# Patient Record
Sex: FEMALE | Race: BLACK OR AFRICAN AMERICAN | NOT HISPANIC OR LATINO | URBAN - METROPOLITAN AREA
[De-identification: names, ages, dates, MRNs, and addresses within clinical notes are randomized per-mention and may not be internally consistent; named-entity substitution may affect disease eponyms.]

---

## 2023-04-05 DIAGNOSIS — I10 PRIMARY HYPERTENSION: Primary | ICD-10-CM

## 2023-04-05 RX ORDER — LOSARTAN POTASSIUM 50 MG/1
50 TABLET ORAL DAILY
Qty: 90 TABLET | Refills: 0 | Status: SHIPPED | OUTPATIENT
Start: 2023-04-05 | End: 2023-06-27 | Stop reason: SDUPTHER

## 2023-04-05 RX ORDER — LOSARTAN POTASSIUM 50 MG/1
50 TABLET ORAL DAILY
COMMUNITY
End: 2023-04-05 | Stop reason: SDUPTHER

## 2023-06-27 ENCOUNTER — TELEPHONE (OUTPATIENT)
Dept: PRIMARY CARE | Facility: CLINIC | Age: 88
End: 2023-06-27
Payer: COMMERCIAL

## 2023-06-27 DIAGNOSIS — I10 PRIMARY HYPERTENSION: ICD-10-CM

## 2023-06-27 RX ORDER — LOSARTAN POTASSIUM 50 MG/1
50 TABLET ORAL DAILY
Qty: 90 TABLET | Refills: 0 | Status: SHIPPED | OUTPATIENT
Start: 2023-06-27 | End: 2023-07-17 | Stop reason: SDUPTHER

## 2023-06-27 NOTE — TELEPHONE ENCOUNTER
RF for RX: Losartan 50 mg      [facility]                            CAN YOU PEND AND SEND TO SEE IF MAGGIE WILL FILL

## 2023-07-17 ENCOUNTER — OFFICE VISIT (OUTPATIENT)
Dept: PRIMARY CARE | Facility: CLINIC | Age: 88
End: 2023-07-17
Payer: COMMERCIAL

## 2023-07-17 VITALS
DIASTOLIC BLOOD PRESSURE: 60 MMHG | HEIGHT: 60 IN | SYSTOLIC BLOOD PRESSURE: 130 MMHG | OXYGEN SATURATION: 95 % | BODY MASS INDEX: 36.6 KG/M2 | HEART RATE: 70 BPM | WEIGHT: 186.4 LBS

## 2023-07-17 DIAGNOSIS — I71.21 AORTIC ROOT ANEURYSM (CMS-HCC): ICD-10-CM

## 2023-07-17 DIAGNOSIS — I10 PRIMARY HYPERTENSION: ICD-10-CM

## 2023-07-17 DIAGNOSIS — E87.6 HYPOKALEMIA: ICD-10-CM

## 2023-07-17 DIAGNOSIS — I73.9 PERIPHERAL VASCULAR DISEASE, UNSPECIFIED (CMS-HCC): Primary | ICD-10-CM

## 2023-07-17 DIAGNOSIS — I10 ESSENTIAL (PRIMARY) HYPERTENSION: ICD-10-CM

## 2023-07-17 DIAGNOSIS — E78.2 MIXED HYPERLIPIDEMIA: ICD-10-CM

## 2023-07-17 DIAGNOSIS — E78.5 HYPERLIPIDEMIA, UNSPECIFIED: ICD-10-CM

## 2023-07-17 PROCEDURE — 99214 OFFICE O/P EST MOD 30 MIN: CPT | Performed by: STUDENT IN AN ORGANIZED HEALTH CARE EDUCATION/TRAINING PROGRAM

## 2023-07-17 PROCEDURE — 3075F SYST BP GE 130 - 139MM HG: CPT | Performed by: STUDENT IN AN ORGANIZED HEALTH CARE EDUCATION/TRAINING PROGRAM

## 2023-07-17 PROCEDURE — 3078F DIAST BP <80 MM HG: CPT | Performed by: STUDENT IN AN ORGANIZED HEALTH CARE EDUCATION/TRAINING PROGRAM

## 2023-07-17 PROCEDURE — 1036F TOBACCO NON-USER: CPT | Performed by: STUDENT IN AN ORGANIZED HEALTH CARE EDUCATION/TRAINING PROGRAM

## 2023-07-17 PROCEDURE — 1160F RVW MEDS BY RX/DR IN RCRD: CPT | Performed by: STUDENT IN AN ORGANIZED HEALTH CARE EDUCATION/TRAINING PROGRAM

## 2023-07-17 PROCEDURE — 1159F MED LIST DOCD IN RCRD: CPT | Performed by: STUDENT IN AN ORGANIZED HEALTH CARE EDUCATION/TRAINING PROGRAM

## 2023-07-17 RX ORDER — FLUTICASONE PROPIONATE 220 UG/1
2 AEROSOL, METERED RESPIRATORY (INHALATION) 2 TIMES DAILY
COMMUNITY
End: 2024-01-08 | Stop reason: SDUPTHER

## 2023-07-17 RX ORDER — FLUTICASONE PROPIONATE 50 MCG
SPRAY, SUSPENSION (ML) NASAL
COMMUNITY
End: 2024-01-15 | Stop reason: SDUPTHER

## 2023-07-17 RX ORDER — LANOLIN ALCOHOL/MO/W.PET/CERES
CREAM (GRAM) TOPICAL
COMMUNITY

## 2023-07-17 RX ORDER — POTASSIUM CHLORIDE 20 MEQ/1
20 TABLET, EXTENDED RELEASE ORAL 2 TIMES DAILY
Qty: 180 TABLET | Refills: 3 | Status: SHIPPED | OUTPATIENT
Start: 2023-07-17

## 2023-07-17 RX ORDER — ATENOLOL 25 MG/1
25 TABLET ORAL DAILY
Qty: 90 TABLET | Refills: 3 | Status: SHIPPED | OUTPATIENT
Start: 2023-07-17 | End: 2023-12-11 | Stop reason: WASHOUT

## 2023-07-17 RX ORDER — HYDROCHLOROTHIAZIDE 25 MG/1
25 TABLET ORAL DAILY
Qty: 90 TABLET | Refills: 1 | Status: SHIPPED | OUTPATIENT
Start: 2023-07-17 | End: 2024-01-09 | Stop reason: SDUPTHER

## 2023-07-17 RX ORDER — MONTELUKAST SODIUM 10 MG/1
10 TABLET ORAL EVERY EVENING
COMMUNITY
End: 2023-10-03

## 2023-07-17 RX ORDER — ATORVASTATIN CALCIUM 40 MG/1
40 TABLET, FILM COATED ORAL NIGHTLY
Qty: 90 TABLET | Refills: 3 | Status: SHIPPED | OUTPATIENT
Start: 2023-07-17

## 2023-07-17 RX ORDER — LOSARTAN POTASSIUM 50 MG/1
50 TABLET ORAL DAILY
Qty: 90 TABLET | Refills: 3 | Status: SHIPPED | OUTPATIENT
Start: 2023-07-17

## 2023-07-17 RX ORDER — CYANOCOBALAMIN (VITAMIN B-12) 500 MCG
TABLET ORAL
COMMUNITY
Start: 2019-06-27

## 2023-07-17 RX ORDER — ALBUTEROL SULFATE 0.83 MG/ML
SOLUTION RESPIRATORY (INHALATION)
COMMUNITY
End: 2023-11-07

## 2023-07-17 NOTE — PATIENT INSTRUCTIONS
Please get your labs done , fasting , Do not eat anything for 12 hrs after dinner and come back the next morning to the lab for blood draw.   Also schedule the ultrasound of your heart  ( Call Echocardiogram )  to check on the aneurysm  ( weakness  of the aorta , big blood vessel  that comes out of the heart )       We are on a new system that is paperless.     Lab location for blood work :  1. Located across the office , just past the elevators .   2. [address].  If you are coming on a Saturday, go to [address] for the blood draw    Radiology : [address] for Xrays  You can also schedule non urgent imaging by calling .     For scheduling appts, call . You might be receiving call from central scheduling as well.    For scheduling colonoscopy, call .     For scheduling physical therapy, call [phone] ( [phone]).    For any other scheduling questions or locations, please ask the medical assistant or the .

## 2023-07-17 NOTE — PROGRESS NOTES
Subjective   Patient ID: Eileen Myers is a 89 y.o. female who presents for New Patient Visit (Establish care. ).        HPI    Est care   Pt's PMH, PSH, SH, FH , meds and allergies was obtained / reviewed and updated .     89 y/oHTN, HLD, Asthma, GERD,  thorasic aortic ectasia, Pulmonary Nodule, Thyroid Nodules, Cervical Radiculopathy, & Osteoarthritis.      Lives at home by herself , has  help at home, one floor    Son brought her here today   She also sees pulm for asthma care       Visit Vitals  /60   Pulse 70   Ht 1.524 m (5')   Wt 84.6 kg (186 lb 6.4 oz)   SpO2 95%   BMI 36.40 kg/m²   Smoking Status Never   BSA 1.89 m²      No LMP recorded.     Review of Systems    Constitutional : No feeling poorly / fevers/ chills / night sweats/ fatigue   Cardiovascular : No CP /Palpitations/ lower extremity edema / syncope   Respiratory : No Cough /DESIR/Dyspnea at rest   Gastrointestinal : No abd pain / N/V  No bloody stools/ melena / constipation  Endo : No polyuria/polydipsia/ muscle weakness / sluggishness   CNS: No confusion / HA/ tingling/ numbness/ weakness of extremities  Psychiatric: No anxiety/ depression/ SI/HI    All other systems have been reviewed and are negative for complaint       Physical Exam    Constitutional : Vitals reviewed. Alert and in no distress  Cardiovascular : RRR, Normal S1, S2, No pericardial rub/ gallop, no peripheral edema   Pulmonary: No respiratory distress, CTAB   MSK :  uses a rollator  Neurologic : CNs 2-12 grossly intact , no obvious FNDs  Psych : A,Ox3, normal mood and affect      Assessment/Plan   Diagnoses and all orders for this visit:  Peripheral vascular disease, unspecified (CMS/HCC)  Primary hypertension  -     CBC; Future  -     Comprehensive Metabolic Panel; Future  -     losartan (Cozaar) 50 mg tablet; Take 1 tablet (50 mg) by mouth once daily.  Mixed hyperlipidemia  -     Lipid Panel; Future  -     TSH with reflex to Free T4 if abnormal; Future  Aortic root aneurysm  (CMS/HCC)  -     Transthoracic Echo (TTE) Complete; Future  Essential (primary) hypertension  -     atenolol (Tenormin) 25 mg tablet; Take 1 tablet (25 mg) by mouth once daily.  -     hydroCHLOROthiazide (HYDRODiuril) 25 mg tablet; Take 1 tablet (25 mg) by mouth once daily.  Hyperlipidemia, unspecified  -     atorvastatin (Lipitor) 40 mg tablet; Take 1 tablet (40 mg) by mouth once daily at bedtime.  Hypokalemia  -     potassium chloride CR (Klor-Con M20) 20 mEq ER tablet; Take 1 tablet (20 mEq) by mouth 2 times a day. Do not crush or chew.    89 y/oHTN, HLD, Asthma, GERD,  thorasic aortic ectasia, Pulmonary Nodule, Thyroid Nodules, Cervical Radiculopathy, & Osteoarthritis.      Echo to monitor thoracic aortic ectasia   Conditions addressed and mgmt as noted above.  Pertinent labs, images/ imaging reports , chart review was done .   Age appropriate labs / labs for mgmt of chronic medical conditions ordered, further mgmt pending the results.          RTO in  Dec for DIANA

## 2023-07-27 ENCOUNTER — PATIENT OUTREACH (OUTPATIENT)
Dept: PRIMARY CARE | Facility: CLINIC | Age: 88
End: 2023-07-27
Payer: COMMERCIAL

## 2023-07-27 RX ORDER — CARVEDILOL 6.25 MG/1
6.25 TABLET ORAL
COMMUNITY
End: 2023-08-07 | Stop reason: SDUPTHER

## 2023-07-27 NOTE — PROGRESS NOTES
Discharge Facility:  [facility]  Discharge Diagnosis:   Hypokalemia, hypomagnesemia, elevated troponin, hypertension  dizziness  Admission Date:  7/25/23  Discharge Date: 7/26/23    PCP Appointment Date:  8/7/23  Specialist Appointment Date:  kala  Hospital Encounter and Summary: Linked   See discharge assessment below for further details     Engagement  Call Start Time: 1222 (7/27/2023 12:24 PM)    Medications  Medications reviewed with patient/caregiver?: Yes (7/27/2023 12:24 PM)  Is the patient having any side effects they believe may be caused by any medication additions or changes?: No (7/27/2023 12:24 PM)  Does the patient have all medications ordered at discharge?: Yes (7/27/2023 12:24 PM)  Is the patient taking all medications as directed (includes completed medication regime)?: Yes (7/27/2023 12:24 PM)  Medication Comments: reviewed new, changed, and discontinued meds.  carvidilol is new (7/27/2023 12:24 PM)    Appointments  Does the patient have a primary care provider?: Yes (7/27/2023 12:24 PM)  Care Management Interventions: -- (appt made 8/7/23) (7/27/2023 12:24 PM)    Self Management  Has home health visited the patient within 72 hours of discharge?: Not applicable (7/27/2023 12:24 PM)    Patient Teaching  Does the patient have access to their discharge instructions?: Yes (7/27/2023 12:24 PM)  What is the patient's perception of their health status since discharge?: Improving (7/27/2023 12:24 PM)  Is the patient/caregiver able to teach back the hierarchy of who to call/visit for symptoms/problems? PCP, Specialist, Home Health nurse, Urgent Care, ED, 911: Yes (7/27/2023 12:24 PM)  Patient/Caregiver Education Comments: states doing better.  has all medications.  no questions or concerns. (7/27/2023 12:24 PM)

## 2023-08-07 ENCOUNTER — OFFICE VISIT (OUTPATIENT)
Dept: PRIMARY CARE | Facility: CLINIC | Age: 88
End: 2023-08-07
Payer: COMMERCIAL

## 2023-08-07 VITALS
HEIGHT: 60 IN | DIASTOLIC BLOOD PRESSURE: 57 MMHG | WEIGHT: 179.6 LBS | OXYGEN SATURATION: 96 % | HEART RATE: 66 BPM | BODY MASS INDEX: 35.26 KG/M2 | SYSTOLIC BLOOD PRESSURE: 120 MMHG

## 2023-08-07 DIAGNOSIS — Z09 HOSPITAL DISCHARGE FOLLOW-UP: ICD-10-CM

## 2023-08-07 DIAGNOSIS — I10 PRIMARY HYPERTENSION: ICD-10-CM

## 2023-08-07 DIAGNOSIS — E66.01 OBESITY, MORBID (MULTI): Primary | ICD-10-CM

## 2023-08-07 PROCEDURE — 99495 TRANSJ CARE MGMT MOD F2F 14D: CPT | Performed by: STUDENT IN AN ORGANIZED HEALTH CARE EDUCATION/TRAINING PROGRAM

## 2023-08-07 PROCEDURE — 1159F MED LIST DOCD IN RCRD: CPT | Performed by: STUDENT IN AN ORGANIZED HEALTH CARE EDUCATION/TRAINING PROGRAM

## 2023-08-07 PROCEDURE — 3074F SYST BP LT 130 MM HG: CPT | Performed by: STUDENT IN AN ORGANIZED HEALTH CARE EDUCATION/TRAINING PROGRAM

## 2023-08-07 PROCEDURE — 1036F TOBACCO NON-USER: CPT | Performed by: STUDENT IN AN ORGANIZED HEALTH CARE EDUCATION/TRAINING PROGRAM

## 2023-08-07 PROCEDURE — 1160F RVW MEDS BY RX/DR IN RCRD: CPT | Performed by: STUDENT IN AN ORGANIZED HEALTH CARE EDUCATION/TRAINING PROGRAM

## 2023-08-07 PROCEDURE — 3078F DIAST BP <80 MM HG: CPT | Performed by: STUDENT IN AN ORGANIZED HEALTH CARE EDUCATION/TRAINING PROGRAM

## 2023-08-07 RX ORDER — CARVEDILOL 6.25 MG/1
6.25 TABLET ORAL
Qty: 180 TABLET | Refills: 3 | Status: SHIPPED | OUTPATIENT
Start: 2023-08-07

## 2023-08-07 NOTE — PROGRESS NOTES
Subjective   Patient ID: Eileen Myers is a 89 y.o. female who presents for hosp dc fu         HPI    89 y/o female with HTN, HPL , Thyroid and  Pulm nodules, mild fusiform dilatation of the ascending thoracic aorta, Cervical Radiculopathy, & Osteoarthritis.        Hosp dc fu   7/25- 7/26  She had non bloody non bilious emesis prior to the hospitalization , found to have electrolyte disturbances and elevated trop   ACS was r/o       Visit Vitals  /57   Pulse 66   Ht 1.524 m (5')   Wt 81.5 kg (179 lb 9.6 oz)   SpO2 96%   BMI 35.08 kg/m²   Smoking Status Never   BSA 1.86 m²      No LMP recorded.     Review of Systems    Constitutional : No feeling poorly / fevers/ chills / night sweats/ fatigue   Cardiovascular : No CP /Palpitations/ lower extremity edema / syncope   Respiratory : No Cough /DESIR/Dyspnea at rest   Gastrointestinal : No abd pain / N/V  No bloody stools/ melena / constipation  Endo : No polyuria/polydipsia/ muscle weakness / sluggishness   CNS: No confusion / HA/ tingling/ numbness/ weakness of extremities  Psychiatric: No anxiety/ depression/ SI/HI    All other systems have been reviewed and are negative for complaint       Physical Exam    Constitutional : Vitals reviewed. Alert and in no distress  Cardiovascular : RRR, Normal S1, S2, No pericardial rub/ gallop, no peripheral edema   Pulmonary: No respiratory distress, CTAB   MSK : Normal gait and station , strength and tone     Neurologic : CNs 2-12 grossly intact , no obvious FNDs  Psych : A,Ox3, normal mood and affect      Assessment/Plan   Diagnoses and all orders for this visit:  Obesity, morbid (CMS/HCC)  Hospital discharge follow-up       89 y/o female with HTN, HPL , Thyroid and  Pulm nodules, mild fusiform dilatation of the ascending thoracic aorta, Cervical Radiculopathy, & Osteoarthritis.      Hospital course, labs and imaging reports reviewed . Med reconciliation done . F/u labs/Imaging, if needed were ordered .   Electrolyte loss  likely from emesis .   Pt advised to stop Atenolol , she is now on Carvedilol 6.25mg BID   RTO as previously advised ( Dec ) or sooner If needed.

## 2023-08-07 NOTE — PATIENT INSTRUCTIONS
Stop the atenolol. Continue Carvedilol 6.25 BID       We are on a new system that is paperless.     Lab location for blood work  ( [address] office ):  1. Located across the office , just past the elevators .   2. [address].  If you are coming on a Saturday, go to [address] for the blood draw    Radiology : [address] for Xrays  You can also schedule non urgent imaging by calling .     For scheduling appts, call . You might be receiving call from central scheduling as well.    For scheduling colonoscopy, call .     For scheduling physical therapy, call [phone] ( [phone]).    For any other scheduling questions or locations, please ask the medical assistant or the .

## 2023-08-10 ENCOUNTER — PATIENT OUTREACH (OUTPATIENT)
Dept: PRIMARY CARE | Facility: CLINIC | Age: 88
End: 2023-08-10
Payer: COMMERCIAL

## 2023-08-10 NOTE — PROGRESS NOTES
Call regarding appt. with PCP on 8/7/23 after hospitalization.  At time of outreach call the patient feels as if their condition has improved since last visit.  Reviewed the PCP appointment with the pt and addressed any questions or concerns.   No questions or concerns.  States appt went good.  Aware to call with questions or concerns.

## 2023-08-14 ENCOUNTER — TELEPHONE (OUTPATIENT)
Dept: PRIMARY CARE | Facility: CLINIC | Age: 88
End: 2023-08-14

## 2023-08-14 NOTE — TELEPHONE ENCOUNTER
PT had called and needs a prescription for home health care and a walker that helps hold her ventilator.  PT also wants a portable toilet so that she can have it on her bed side.  PT wants home health care to be there for more than 1 hour a day and more than 1 day a week.  PT wants to have home health care at her house on Mondays, Wednesdays and Fridays.  PT wants to be let know when and how long the home health care would be at the PT house.

## 2023-08-24 ENCOUNTER — PATIENT OUTREACH (OUTPATIENT)
Dept: PRIMARY CARE | Facility: CLINIC | Age: 88
End: 2023-08-24
Payer: COMMERCIAL

## 2023-08-24 NOTE — PROGRESS NOTES
Call placed regarding one month post discharge follow up call.  At time of outreach call the patient feels as if their condition has improved since initial visit with PCP or specialist.  States doing good.  No questions or concerns at this time.

## 2023-09-25 NOTE — TELEPHONE ENCOUNTER
PT calling back to see if Dr. Gonzales can write a script for a walker with a seat. PT also would like homecare services because she needs help and would like to stay in her home. Please advise

## 2023-10-02 ENCOUNTER — TELEPHONE (OUTPATIENT)
Dept: PRIMARY CARE | Facility: CLINIC | Age: 88
End: 2023-10-02
Payer: COMMERCIAL

## 2023-10-02 NOTE — TELEPHONE ENCOUNTER
PT called in and stated that she needs RF on all her scripts 90 day supply sent to [facility]

## 2023-10-09 DIAGNOSIS — R26.2 DETERIORATION IN ABILITY TO WALK: Primary | ICD-10-CM

## 2023-10-09 DIAGNOSIS — Z91.81 AT MAXIMUM RISK FOR FALL: ICD-10-CM

## 2023-10-23 ENCOUNTER — PATIENT OUTREACH (OUTPATIENT)
Dept: PRIMARY CARE | Facility: CLINIC | Age: 88
End: 2023-10-23
Payer: COMMERCIAL

## 2023-10-23 NOTE — PROGRESS NOTES
Patient has met target of no readmission for (90) days post hospital  discharge and is graduated from Transitional Care Management program at this time.     Spoke with melvin.  States doing good.  Aware to follow up with PCP as needed/directed.

## 2023-11-07 DIAGNOSIS — J45.909 UNSPECIFIED ASTHMA, UNCOMPLICATED (HHS-HCC): ICD-10-CM

## 2023-11-07 RX ORDER — ALBUTEROL SULFATE 0.83 MG/ML
SOLUTION RESPIRATORY (INHALATION)
Qty: 360 ML | Refills: 2 | Status: SHIPPED | OUTPATIENT
Start: 2023-11-07 | End: 2024-01-08 | Stop reason: SDUPTHER

## 2023-11-14 NOTE — TELEPHONE ENCOUNTER
Patient sts her nebulizer has stopped working. She reached out to [facility] and they advised that we send in a new order since her machine is over 5 years old.   
verbal instruction

## 2023-11-27 ENCOUNTER — TELEPHONE (OUTPATIENT)
Dept: PULMONOLOGY | Facility: CLINIC | Age: 88
End: 2023-11-27
Payer: COMMERCIAL

## 2023-11-27 NOTE — TELEPHONE ENCOUNTER
Patient is requesting a new nebulizer. She sts the one she has is over 5yrs old and has stopped working.    Please send a new script to [facility].

## 2023-11-28 ENCOUNTER — TELEPHONE (OUTPATIENT)
Dept: PULMONOLOGY | Facility: CLINIC | Age: 88
End: 2023-11-28
Payer: COMMERCIAL

## 2023-11-28 NOTE — TELEPHONE ENCOUNTER
Script was sent to [facility] as requested for anew nebulizer machine

## 2023-11-28 NOTE — TELEPHONE ENCOUNTER
Favio from [facility] in order for the patient to get a new nebulizer machine she has to be seen in our office in the last 30 days per insurance requirements.    I spoke directly with the patient informing her of the information. She already has an appointment set up for 12/11/23 for her usual follow up and is willing to wait until then. She sts she can still use the nebulizer machine it just takes a while.

## 2023-12-11 ENCOUNTER — OFFICE VISIT (OUTPATIENT)
Dept: PULMONOLOGY | Facility: CLINIC | Age: 88
End: 2023-12-11
Payer: COMMERCIAL

## 2023-12-11 ENCOUNTER — OFFICE VISIT (OUTPATIENT)
Dept: PRIMARY CARE | Facility: CLINIC | Age: 88
End: 2023-12-11
Payer: COMMERCIAL

## 2023-12-11 VITALS
HEIGHT: 60 IN | RESPIRATION RATE: 17 BRPM | HEART RATE: 73 BPM | WEIGHT: 177 LBS | BODY MASS INDEX: 34.75 KG/M2 | DIASTOLIC BLOOD PRESSURE: 67 MMHG | OXYGEN SATURATION: 97 % | SYSTOLIC BLOOD PRESSURE: 137 MMHG

## 2023-12-11 VITALS
TEMPERATURE: 95.8 F | DIASTOLIC BLOOD PRESSURE: 62 MMHG | OXYGEN SATURATION: 99 % | SYSTOLIC BLOOD PRESSURE: 126 MMHG | HEIGHT: 60 IN | WEIGHT: 177.4 LBS | BODY MASS INDEX: 34.83 KG/M2 | HEART RATE: 78 BPM

## 2023-12-11 DIAGNOSIS — Z91.81 AT HIGH RISK FOR FALLS: ICD-10-CM

## 2023-12-11 DIAGNOSIS — Z00.00 MEDICARE ANNUAL WELLNESS VISIT, SUBSEQUENT: Primary | ICD-10-CM

## 2023-12-11 DIAGNOSIS — J45.909 ASTHMA, UNSPECIFIED ASTHMA SEVERITY, UNSPECIFIED WHETHER COMPLICATED, UNSPECIFIED WHETHER PERSISTENT (HHS-HCC): Primary | ICD-10-CM

## 2023-12-11 DIAGNOSIS — M15.9 GOA (GENERALIZED OSTEOARTHRITIS): ICD-10-CM

## 2023-12-11 PROBLEM — H04.129 DRY EYE: Status: ACTIVE | Noted: 2023-12-11

## 2023-12-11 PROBLEM — M54.12 CHRONIC CERVICAL RADICULOPATHY: Status: ACTIVE | Noted: 2023-12-11

## 2023-12-11 PROBLEM — M79.642 PAIN IN BOTH HANDS: Status: ACTIVE | Noted: 2023-12-11

## 2023-12-11 PROBLEM — M19.90 ARTHRITIS: Status: ACTIVE | Noted: 2023-12-11

## 2023-12-11 PROBLEM — E53.8 VITAMIN B 12 DEFICIENCY: Status: ACTIVE | Noted: 2023-12-11

## 2023-12-11 PROBLEM — R05.3 CHRONIC COUGH: Status: ACTIVE | Noted: 2023-12-11

## 2023-12-11 PROBLEM — R91.1 LUNG NODULE: Status: ACTIVE | Noted: 2023-12-11

## 2023-12-11 PROBLEM — E55.9 VITAMIN D DEFICIENCY: Status: ACTIVE | Noted: 2023-12-11

## 2023-12-11 PROBLEM — M17.0 OSTEOARTHRITIS OF BOTH KNEES: Status: ACTIVE | Noted: 2023-12-11

## 2023-12-11 PROBLEM — R53.81 PHYSICAL DECONDITIONING: Status: ACTIVE | Noted: 2023-12-11

## 2023-12-11 PROBLEM — I71.21 ASCENDING AORTIC ANEURYSM (CMS-HCC): Status: ACTIVE | Noted: 2023-12-11

## 2023-12-11 PROBLEM — M54.2 CERVICALGIA: Status: ACTIVE | Noted: 2023-12-11

## 2023-12-11 PROBLEM — M79.604 PAIN IN LATERAL RIGHT LOWER EXTREMITY: Status: ACTIVE | Noted: 2023-12-11

## 2023-12-11 PROBLEM — M79.641 PAIN IN BOTH HANDS: Status: ACTIVE | Noted: 2023-12-11

## 2023-12-11 PROBLEM — K21.9 GASTROESOPHAGEAL REFLUX DISEASE: Status: ACTIVE | Noted: 2023-12-11

## 2023-12-11 PROBLEM — E87.6 HYPOKALEMIA: Status: ACTIVE | Noted: 2023-12-11

## 2023-12-11 PROCEDURE — 99214 OFFICE O/P EST MOD 30 MIN: CPT | Performed by: INTERNAL MEDICINE

## 2023-12-11 PROCEDURE — 3078F DIAST BP <80 MM HG: CPT | Performed by: STUDENT IN AN ORGANIZED HEALTH CARE EDUCATION/TRAINING PROGRAM

## 2023-12-11 PROCEDURE — 3075F SYST BP GE 130 - 139MM HG: CPT | Performed by: STUDENT IN AN ORGANIZED HEALTH CARE EDUCATION/TRAINING PROGRAM

## 2023-12-11 PROCEDURE — 1036F TOBACCO NON-USER: CPT | Performed by: STUDENT IN AN ORGANIZED HEALTH CARE EDUCATION/TRAINING PROGRAM

## 2023-12-11 PROCEDURE — 1160F RVW MEDS BY RX/DR IN RCRD: CPT | Performed by: INTERNAL MEDICINE

## 2023-12-11 PROCEDURE — 1159F MED LIST DOCD IN RCRD: CPT | Performed by: INTERNAL MEDICINE

## 2023-12-11 PROCEDURE — 1170F FXNL STATUS ASSESSED: CPT | Performed by: STUDENT IN AN ORGANIZED HEALTH CARE EDUCATION/TRAINING PROGRAM

## 2023-12-11 PROCEDURE — 1036F TOBACCO NON-USER: CPT | Performed by: INTERNAL MEDICINE

## 2023-12-11 PROCEDURE — 1160F RVW MEDS BY RX/DR IN RCRD: CPT | Performed by: STUDENT IN AN ORGANIZED HEALTH CARE EDUCATION/TRAINING PROGRAM

## 2023-12-11 PROCEDURE — G0439 PPPS, SUBSEQ VISIT: HCPCS | Performed by: STUDENT IN AN ORGANIZED HEALTH CARE EDUCATION/TRAINING PROGRAM

## 2023-12-11 PROCEDURE — 1159F MED LIST DOCD IN RCRD: CPT | Performed by: STUDENT IN AN ORGANIZED HEALTH CARE EDUCATION/TRAINING PROGRAM

## 2023-12-11 ASSESSMENT — ENCOUNTER SYMPTOMS
WHEEZING: 0
EYE DISCHARGE: 0
UNEXPECTED WEIGHT CHANGE: 0
STRIDOR: 0
COUGH: 0
SINUS PAIN: 0
ABDOMINAL PAIN: 0
ARTHRALGIAS: 0
FREQUENCY: 0
TREMORS: 0
SINUS PRESSURE: 0
DYSURIA: 0
DIFFICULTY URINATING: 0
ABDOMINAL DISTENTION: 0
DIZZINESS: 0
OCCASIONAL FEELINGS OF UNSTEADINESS: 0
FEVER: 0
NUMBNESS: 0
EYE REDNESS: 0
LOSS OF SENSATION IN FEET: 1
CHOKING: 0
APNEA: 0
CONSTIPATION: 0
AGITATION: 0
BRUISES/BLEEDS EASILY: 0
NAUSEA: 0
PALPITATIONS: 0
SHORTNESS OF BREATH: 0
JOINT SWELLING: 0
NERVOUS/ANXIOUS: 0
RHINORRHEA: 0
FACIAL SWELLING: 0
DEPRESSION: 0
LIGHT-HEADEDNESS: 0
HEADACHES: 0
ADENOPATHY: 0
WEAKNESS: 0
SLEEP DISTURBANCE: 0
HEMATURIA: 0
SPEECH DIFFICULTY: 0
FATIGUE: 0

## 2023-12-11 ASSESSMENT — ACTIVITIES OF DAILY LIVING (ADL)
DOING_HOUSEWORK: NEEDS ASSISTANCE
GROCERY_SHOPPING: INDEPENDENT
MANAGING_FINANCES: INDEPENDENT
BATHING: INDEPENDENT
DOING_HOUSEWORK: INDEPENDENT
MANAGING_FINANCES: INDEPENDENT
GROCERY_SHOPPING: INDEPENDENT
DRESSING: INDEPENDENT
TAKING_MEDICATION: INDEPENDENT
BATHING: INDEPENDENT
TAKING_MEDICATION: INDEPENDENT

## 2023-12-11 ASSESSMENT — PATIENT HEALTH QUESTIONNAIRE - PHQ9
SUM OF ALL RESPONSES TO PHQ9 QUESTIONS 1 AND 2: 0
1. LITTLE INTEREST OR PLEASURE IN DOING THINGS: NOT AT ALL
SUM OF ALL RESPONSES TO PHQ9 QUESTIONS 1 AND 2: 0
1. LITTLE INTEREST OR PLEASURE IN DOING THINGS: NOT AT ALL
2. FEELING DOWN, DEPRESSED OR HOPELESS: NOT AT ALL
2. FEELING DOWN, DEPRESSED OR HOPELESS: NOT AT ALL
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
## 2023-12-11 NOTE — PROGRESS NOTES
Subjective   Reason for Visit: Eileen Myers is an 89 y.o. female here for a Medicare Wellness visit.     Past Medical, Surgical, and Family History reviewed and updated in chart.    Reviewed all medications by prescribing practitioner or clinical pharmacist (such as prescriptions, OTCs, herbal therapies and supplements) and documented in the medical record.    HPI  89 y/o female with HTN, HPL , Thyroid and  Pulm nodules, mild fusiform dilatation of the ascending thoracic aorta, Cervical Radiculopathy, & Osteoarthritis.       She would like a rollator and A  Patient Care Team:  Christelle Gonzales MD as PCP - General (Family Medicine)  Christelle Gonzales MD as PCP - [employer] PCP     Review of Systems    Constitutional: no chills, no fever and no night sweats.     Eyes: no blurred vision and no eyesight problems.     ENT: no hearing loss, no nasal congestion, no nasal discharge, no hoarseness and no sore throat.     Cardiovascular: no chest pain, no intermittent leg claudication, no lower extremity edema, no palpitations and no syncope.     Respiratory: no cough, no shortness of breath during exertion, no shortness of breath at rest and no wheezing.     Gastrointestinal: no abdominal pain, no blood in stools, no constipation, no diarrhea, no melena, no nausea, no rectal pain and no vomiting.     Genitourinary: no dysuria, no change in urinary frequency, no urinary hesitancy, no feelings of urinary urgency and no vaginal discharge.     Musculoskeletal: no arthralgias, no back pain and no myalgias.     Integumentary: no new skin lesions and no rashes.     Neurological: no difficulty walking, no headache, no limb weakness, no numbness and no tingling.     Psychiatric: no anxiety, no depression, no anhedonia and no substance use disorders.     Endocrine: no recent weight gain and no recent weight loss.     Hematologic/Lymphatic: no tendency for easy bruising and no swollen glands.          All  other systems have been reviewed and are negative for complaint.    Objective   Vitals:  /67   Pulse 73   Resp 17   Ht 1.524 m (5')   Wt 80.3 kg (177 lb)   SpO2 97%   BMI 34.57 kg/m²       Physical Exam  Constitutional: Alert and in no acute distress. Well developed, well nourished.     Eyes: Normal external exam. Pupils were equal in size, round, reactive to light (PERRL) with normal accommodation and extraocular movements intact (EOMI).     Ears, Nose, Mouth, and Throat: External inspection of ears and nose: Normal.  Otoscopic examination: Normal.      Neck: No neck mass was observed. Supple.     Cardiovascular: Heart rate and rhythm were normal, normal S1 and S2, no gallops, no murmurs and no pericardial rub    Pulmonary: No respiratory distress. Clear bilateral breath sounds.     Abdomen: Soft nontender; no abdominal mass palpated. No organomegaly.     Musculoskeletal: No joint swelling seen, normal movements of all extremities. Range of motion: Normal.  Muscle strength/tone: Normal.          Neurologic: Deep tendon reflexes were 2+ and symmetric. Sensation: Normal.     Psychiatric: Judgment and insight: Intact. Mood and affect: Normal.      Assessment/Plan   Problem List Items Addressed This Visit    None  Visit Diagnoses       Medicare annual wellness visit, subsequent    -  Primary    GOA (generalized osteoarthritis)        Relevant Orders    Walker with Seat    At high risk for falls        Relevant Orders    Walker with Seat             89 y/o female with HTN, HPL , Thyroid, asthma  and  Pulm nodules, mild fusiform dilatation of the ascending thoracic aorta ( imaging done in 2016), Cervical Radiculopathy, & Osteoarthritis.       Immunizations :  Influenza : declined  Prevnar 20 : declined  Pneumovax 23:  declined  Shingles: recommended to receive at the pharmacy/ declined    Cancer screenings:   Mammogram :   not indicated  Cervical cancer:  not indicated  Colon cancer:   not indicated  Lung  cancer :     not indicated  HIV screening:    not indicated  Osteoporosis :

## 2023-12-13 ENCOUNTER — APPOINTMENT (OUTPATIENT)
Dept: PULMONOLOGY | Facility: CLINIC | Age: 88
End: 2023-12-13
Payer: COMMERCIAL

## 2023-12-18 ENCOUNTER — TELEPHONE (OUTPATIENT)
Dept: PRIMARY CARE | Facility: CLINIC | Age: 88
End: 2023-12-18
Payer: COMMERCIAL

## 2024-01-08 ENCOUNTER — TELEPHONE (OUTPATIENT)
Dept: PRIMARY CARE | Facility: CLINIC | Age: 89
End: 2024-01-08
Payer: COMMERCIAL

## 2024-01-08 DIAGNOSIS — I10 ESSENTIAL (PRIMARY) HYPERTENSION: ICD-10-CM

## 2024-01-08 DIAGNOSIS — J45.909 UNSPECIFIED ASTHMA, UNCOMPLICATED (HHS-HCC): Primary | ICD-10-CM

## 2024-01-08 RX ORDER — MONTELUKAST SODIUM 10 MG/1
10 TABLET ORAL EVERY EVENING
Qty: 90 TABLET | Refills: 3 | Status: SHIPPED | OUTPATIENT
Start: 2024-01-08

## 2024-01-08 RX ORDER — FLUTICASONE PROPIONATE 220 UG/1
2 AEROSOL, METERED RESPIRATORY (INHALATION) 2 TIMES DAILY
Qty: 12 G | Refills: 11 | Status: SHIPPED | OUTPATIENT
Start: 2024-01-08 | End: 2024-01-08

## 2024-01-08 RX ORDER — ALBUTEROL SULFATE 0.83 MG/ML
SOLUTION RESPIRATORY (INHALATION)
Qty: 360 ML | Refills: 2 | Status: SHIPPED | OUTPATIENT
Start: 2024-01-08 | End: 2024-01-10 | Stop reason: SDUPTHER

## 2024-01-08 NOTE — TELEPHONE ENCOUNTER
Pt called in for refills on potassium 20 mg carvedilol 6.25 mg losartan 50mg hydrochlorothiazide 25 mg atrovastain 40 mg please send to [facility] [address] pt also request an order for rollator that should go to [facility]

## 2024-01-08 NOTE — TELEPHONE ENCOUNTER
Patient believes they are discontinuing the Flovent and wants to know what she should take in it's place.    Have you heard anything about Flovent being discontinued?       Will switch to Qvar 44, 2 puffs twice daily

## 2024-01-09 RX ORDER — HYDROCHLOROTHIAZIDE 25 MG/1
25 TABLET ORAL DAILY
Qty: 90 TABLET | Refills: 1 | Status: SHIPPED | OUTPATIENT
Start: 2024-01-09

## 2024-01-10 DIAGNOSIS — J45.909 UNSPECIFIED ASTHMA, UNCOMPLICATED (HHS-HCC): ICD-10-CM

## 2024-01-10 RX ORDER — ALBUTEROL SULFATE 0.83 MG/ML
SOLUTION RESPIRATORY (INHALATION)
Qty: 360 ML | Refills: 2 | Status: SHIPPED | OUTPATIENT
Start: 2024-01-10

## 2024-01-10 RX ORDER — ALBUTEROL SULFATE 0.83 MG/ML
SOLUTION RESPIRATORY (INHALATION)
Qty: 360 ML | Refills: 2 | Status: SHIPPED | OUTPATIENT
Start: 2024-01-10 | End: 2024-01-10 | Stop reason: SDUPTHER

## 2024-01-10 NOTE — TELEPHONE ENCOUNTER
Albuterol neb solution needs to be sent to [facility]. Can you print it so I can fax it? Thanks

## 2024-01-15 DIAGNOSIS — J45.909 ASTHMA, UNSPECIFIED ASTHMA SEVERITY, UNSPECIFIED WHETHER COMPLICATED, UNSPECIFIED WHETHER PERSISTENT (HHS-HCC): Primary | ICD-10-CM

## 2024-01-15 DIAGNOSIS — R09.81 NASAL SINUS CONGESTION: ICD-10-CM

## 2024-01-15 RX ORDER — FLUTICASONE PROPIONATE 50 MCG
SPRAY, SUSPENSION (ML) NASAL
Qty: 16 G | Refills: 11 | Status: SHIPPED | OUTPATIENT
Start: 2024-01-15 | End: 2024-01-17

## 2024-01-31 DIAGNOSIS — K21.9 GASTRO-ESOPHAGEAL REFLUX DISEASE WITHOUT ESOPHAGITIS: ICD-10-CM

## 2024-01-31 RX ORDER — ESOMEPRAZOLE MAGNESIUM 40 MG/1
40 CAPSULE, DELAYED RELEASE ORAL DAILY
Qty: 90 CAPSULE | Refills: 3 | Status: SHIPPED | OUTPATIENT
Start: 2024-01-31 | End: 2024-02-05 | Stop reason: SDUPTHER

## 2024-02-05 ENCOUNTER — OFFICE VISIT (OUTPATIENT)
Dept: PRIMARY CARE | Facility: CLINIC | Age: 89
End: 2024-02-05
Payer: COMMERCIAL

## 2024-02-05 VITALS
TEMPERATURE: 97.9 F | HEART RATE: 74 BPM | SYSTOLIC BLOOD PRESSURE: 140 MMHG | HEIGHT: 60 IN | WEIGHT: 176.8 LBS | OXYGEN SATURATION: 98 % | BODY MASS INDEX: 34.71 KG/M2 | DIASTOLIC BLOOD PRESSURE: 60 MMHG

## 2024-02-05 DIAGNOSIS — M15.9 GOA (GENERALIZED OSTEOARTHRITIS): Primary | ICD-10-CM

## 2024-02-05 DIAGNOSIS — J45.40 MODERATE PERSISTENT ASTHMA WITHOUT COMPLICATION (HHS-HCC): ICD-10-CM

## 2024-02-05 DIAGNOSIS — M17.0 PRIMARY OSTEOARTHRITIS OF BOTH KNEES: ICD-10-CM

## 2024-02-05 DIAGNOSIS — K21.9 GASTRO-ESOPHAGEAL REFLUX DISEASE WITHOUT ESOPHAGITIS: ICD-10-CM

## 2024-02-05 DIAGNOSIS — E66.01 OBESITY, MORBID (MULTI): ICD-10-CM

## 2024-02-05 DIAGNOSIS — I71.21 ANEURYSM OF THE ASCENDING AORTA, WITHOUT RUPTURE (CMS-HCC): ICD-10-CM

## 2024-02-05 PROCEDURE — 1036F TOBACCO NON-USER: CPT | Performed by: STUDENT IN AN ORGANIZED HEALTH CARE EDUCATION/TRAINING PROGRAM

## 2024-02-05 PROCEDURE — 1160F RVW MEDS BY RX/DR IN RCRD: CPT | Performed by: STUDENT IN AN ORGANIZED HEALTH CARE EDUCATION/TRAINING PROGRAM

## 2024-02-05 PROCEDURE — 3077F SYST BP >= 140 MM HG: CPT | Performed by: STUDENT IN AN ORGANIZED HEALTH CARE EDUCATION/TRAINING PROGRAM

## 2024-02-05 PROCEDURE — 3078F DIAST BP <80 MM HG: CPT | Performed by: STUDENT IN AN ORGANIZED HEALTH CARE EDUCATION/TRAINING PROGRAM

## 2024-02-05 PROCEDURE — 99214 OFFICE O/P EST MOD 30 MIN: CPT | Performed by: STUDENT IN AN ORGANIZED HEALTH CARE EDUCATION/TRAINING PROGRAM

## 2024-02-05 PROCEDURE — 1159F MED LIST DOCD IN RCRD: CPT | Performed by: STUDENT IN AN ORGANIZED HEALTH CARE EDUCATION/TRAINING PROGRAM

## 2024-02-05 RX ORDER — ESOMEPRAZOLE MAGNESIUM 40 MG/1
40 CAPSULE, DELAYED RELEASE ORAL DAILY
Qty: 90 CAPSULE | Refills: 3 | Status: SHIPPED | OUTPATIENT
Start: 2024-02-05

## 2024-02-05 RX ORDER — DICLOFENAC SODIUM 10 MG/G
4 GEL TOPICAL 4 TIMES DAILY PRN
Qty: 100 G | Refills: 3 | Status: SHIPPED | OUTPATIENT
Start: 2024-02-05

## 2024-02-05 NOTE — PROGRESS NOTES
Subjective   Patient ID: Eileen Myers is a 89 y.o. female who presents for Follow-up (Bilateral leg pain/ Rt leg is worst.).        HPI    89 y/o female with HTN, HPL , Thyroid and  Pulm nodules, mild fusiform dilatation of the ascending thoracic aorta, Cervical Radiculopathy, & Osteoarthritis.        She is here alone by herself . Someone else drove her to the visit , who is in the waiting room   C/o joint pains   Labs from July yet to be done   She continues to ask abt HHA     She wonders why she can't get nexium , likely brand name is not covered any more.      Visit Vitals  /60 (BP Location: Left arm, Patient Position: Sitting, BP Cuff Size: Adult)   Pulse 74   Temp 36.6 °C (97.9 °F)   Ht 1.524 m (5')   Wt 80.2 kg (176 lb 12.8 oz)   SpO2 98%   BMI 34.53 kg/m²   Smoking Status Never   BSA 1.84 m²      No LMP recorded.     Review of Systems    Constitutional : No feeling poorly / fevers/ chills / night sweats/ fatigue   Cardiovascular : No CP /Palpitations/ lower extremity edema / syncope   Respiratory : No Cough /DESIR/Dyspnea at rest   Gastrointestinal : No abd pain / N/V  No bloody stools/ melena / constipation  Endo : No polyuria/polydipsia/ muscle weakness / sluggishness   CNS: No confusion / HA/ tingling/ numbness/ weakness of extremities  Psychiatric: No anxiety/ depression/ SI/HI    All other systems have been reviewed and are negative for complaint       Physical Exam    Constitutional : Vitals reviewed. Alert and in no distress  Cardiovascular : RRR, Normal S1, S2, No pericardial rub/ gallop, no peripheral edema   Pulmonary: No respiratory distress, CTAB   MSK : Normal gait and station , strength and tone     Neurologic : CNs 2-12 grossly intact , no obvious FNDs  Psych : A,Ox3, normal mood and affect      Assessment/Plan   Diagnoses and all orders for this visit:  GOA (generalized osteoarthritis)  -     diclofenac sodium (Voltaren) 1 % gel; Apply 4.5 inches (4 g) topically 4 times a day as needed  (for joint pain).  Chronic obstructive pulmonary disease, unspecified COPD type (CMS/HCC)  Aneurysm of the ascending aorta, without rupture (CMS/HCC)  Obesity, morbid (CMS/HCC)  Primary osteoarthritis of both knees  -     Referral to Orthopaedic Surgery; Future  Gastro-esophageal reflux disease without esophagitis  -     esomeprazole (NexIUM) 40 mg DR capsule; Take 1 capsule (40 mg) by mouth once daily.      89 y/o female with HTN, HPL , Thyroid and  Pulm nodules, mild fusiform dilatation of the ascending thoracic aorta, Cervical Radiculopathy, & Osteoarthritis.         Xray 2021 of knee reviewed, DJD discussed   Ortho referral placed for possible intraarticular injection   OK to take generic PPI   Labs from July to be done today   HHA : not sure if she has insurance coverage to provide HHA for few hrs in a week , will ask MA to call her health insurance to find out   Family members not involved In her care  She will benefit from a HHA given her age related morbidity       Conditions addressed and mgmt as noted above.  Pertinent labs, images/ imaging reports , chart review was done .   Age appropriate labs / labs for mgmt of chronic medical conditions ordered, further mgmt pending the results.

## 2024-02-06 ENCOUNTER — TELEPHONE (OUTPATIENT)
Dept: PRIMARY CARE | Facility: CLINIC | Age: 89
End: 2024-02-06
Payer: COMMERCIAL

## 2024-02-06 NOTE — TELEPHONE ENCOUNTER
----- Message from Christelle Gonzales MD sent at 2/6/2024  9:49 AM EST -----  Call and inform the patient that you  spoke to her health insurance and that she is eligible for a home health.  And she needs to find an agency that is within network so that she will be covered.  Do you know  how many hours/ week  she will be covered?  ----- Message -----  From: Josie Nunes MA  Sent: 2/6/2024   9:17 AM EST  To: Christelle Gonzales MD    She would have to find one and as long as it is in network it will be covered.   ----- Message -----  From: Christelle Gonzales MD  Sent: 2/6/2024   8:59 AM EST  To: Josie Nunes MA    Did you ask them how to proceed? As mentioned, no family member is involved and we have to initiate the process  ----- Message -----  From: Josie Nunes MA  Sent: 2/6/2024   8:55 AM EST  To: Christelle Gonzales MD    Yes she does qualify for HHA.   ----- Message -----  From: Christelle Gonzales MD  Sent: 2/5/2024  10:58 AM EST  To: Josie Nunes MA    Please call her health insurance and see if she qualifies for HHA  She does not have any family members looks like

## 2024-02-08 ENCOUNTER — LAB (OUTPATIENT)
Dept: LAB | Facility: LAB | Age: 89
End: 2024-02-08
Payer: COMMERCIAL

## 2024-02-08 DIAGNOSIS — I10 PRIMARY HYPERTENSION: ICD-10-CM

## 2024-02-08 DIAGNOSIS — E78.2 MIXED HYPERLIPIDEMIA: ICD-10-CM

## 2024-02-08 LAB
ALBUMIN SERPL BCP-MCNC: 3.5 G/DL (ref 3.4–5)
ALP SERPL-CCNC: 101 U/L (ref 33–136)
ALT SERPL W P-5'-P-CCNC: 13 U/L (ref 7–45)
ANION GAP SERPL CALC-SCNC: 12 MMOL/L (ref 10–20)
AST SERPL W P-5'-P-CCNC: 15 U/L (ref 9–39)
BILIRUB SERPL-MCNC: 0.4 MG/DL (ref 0–1.2)
BUN SERPL-MCNC: 16 MG/DL (ref 6–23)
CALCIUM SERPL-MCNC: 8.8 MG/DL (ref 8.6–10.3)
CHLORIDE SERPL-SCNC: 102 MMOL/L (ref 98–107)
CHOLEST SERPL-MCNC: 132 MG/DL (ref 0–199)
CHOLESTEROL/HDL RATIO: 2.6
CO2 SERPL-SCNC: 28 MMOL/L (ref 21–32)
CREAT SERPL-MCNC: 0.99 MG/DL (ref 0.5–1.05)
EGFRCR SERPLBLD CKD-EPI 2021: 53 ML/MIN/1.73M*2
ERYTHROCYTE [DISTWIDTH] IN BLOOD BY AUTOMATED COUNT: 13.6 % (ref 11.5–14.5)
GLUCOSE SERPL-MCNC: 101 MG/DL (ref 74–99)
HCT VFR BLD AUTO: 42.5 % (ref 36–46)
HDLC SERPL-MCNC: 51.1 MG/DL
HGB BLD-MCNC: 13.3 G/DL (ref 12–16)
LDLC SERPL CALC-MCNC: 62 MG/DL
MCH RBC QN AUTO: 27.8 PG (ref 26–34)
MCHC RBC AUTO-ENTMCNC: 31.3 G/DL (ref 32–36)
MCV RBC AUTO: 89 FL (ref 80–100)
NON HDL CHOLESTEROL: 81 MG/DL (ref 0–149)
NRBC BLD-RTO: 0 /100 WBCS (ref 0–0)
PLATELET # BLD AUTO: 173 X10*3/UL (ref 150–450)
POTASSIUM SERPL-SCNC: 4.1 MMOL/L (ref 3.5–5.3)
PROT SERPL-MCNC: 6.3 G/DL (ref 6.4–8.2)
RBC # BLD AUTO: 4.78 X10*6/UL (ref 4–5.2)
SODIUM SERPL-SCNC: 138 MMOL/L (ref 136–145)
T4 FREE SERPL-MCNC: 0.96 NG/DL (ref 0.61–1.12)
TRIGL SERPL-MCNC: 97 MG/DL (ref 0–149)
TSH SERPL-ACNC: 4.25 MIU/L (ref 0.44–3.98)
VLDL: 19 MG/DL (ref 0–40)
WBC # BLD AUTO: 5.3 X10*3/UL (ref 4.4–11.3)

## 2024-02-08 PROCEDURE — 80061 LIPID PANEL: CPT

## 2024-02-08 PROCEDURE — 85027 COMPLETE CBC AUTOMATED: CPT

## 2024-02-08 PROCEDURE — 84439 ASSAY OF FREE THYROXINE: CPT

## 2024-02-08 PROCEDURE — 36415 COLL VENOUS BLD VENIPUNCTURE: CPT

## 2024-02-08 PROCEDURE — 80053 COMPREHEN METABOLIC PANEL: CPT

## 2024-02-08 PROCEDURE — 84443 ASSAY THYROID STIM HORMONE: CPT

## 2024-02-13 ENCOUNTER — TELEPHONE (OUTPATIENT)
Dept: PRIMARY CARE | Facility: CLINIC | Age: 89
End: 2024-02-13

## 2024-02-13 ENCOUNTER — APPOINTMENT (OUTPATIENT)
Dept: RADIOLOGY | Facility: HOSPITAL | Age: 89
End: 2024-02-13
Payer: COMMERCIAL

## 2024-02-13 ENCOUNTER — HOSPITAL ENCOUNTER (OUTPATIENT)
Dept: RADIOLOGY | Facility: HOSPITAL | Age: 89
Discharge: HOME | End: 2024-02-13
Payer: COMMERCIAL

## 2024-02-13 ENCOUNTER — OFFICE VISIT (OUTPATIENT)
Dept: ORTHOPEDIC SURGERY | Facility: HOSPITAL | Age: 89
End: 2024-02-13
Payer: COMMERCIAL

## 2024-02-13 DIAGNOSIS — M17.0 PRIMARY OSTEOARTHRITIS OF BOTH KNEES: ICD-10-CM

## 2024-02-13 DIAGNOSIS — M17.0 PRIMARY OSTEOARTHRITIS OF BOTH KNEES: Primary | ICD-10-CM

## 2024-02-13 PROCEDURE — 20611 DRAIN/INJ JOINT/BURSA W/US: CPT | Performed by: FAMILY MEDICINE

## 2024-02-13 PROCEDURE — 2500000005 HC RX 250 GENERAL PHARMACY W/O HCPCS: Performed by: FAMILY MEDICINE

## 2024-02-13 PROCEDURE — 2500000004 HC RX 250 GENERAL PHARMACY W/ HCPCS (ALT 636 FOR OP/ED): Performed by: FAMILY MEDICINE

## 2024-02-13 PROCEDURE — 73562 X-RAY EXAM OF KNEE 3: CPT | Mod: RT

## 2024-02-13 PROCEDURE — 1036F TOBACCO NON-USER: CPT | Performed by: FAMILY MEDICINE

## 2024-02-13 PROCEDURE — 73560 X-RAY EXAM OF KNEE 1 OR 2: CPT | Mod: LT

## 2024-02-13 PROCEDURE — 1159F MED LIST DOCD IN RCRD: CPT | Performed by: FAMILY MEDICINE

## 2024-02-13 PROCEDURE — 99204 OFFICE O/P NEW MOD 45 MIN: CPT | Performed by: FAMILY MEDICINE

## 2024-02-13 PROCEDURE — 99214 OFFICE O/P EST MOD 30 MIN: CPT | Performed by: FAMILY MEDICINE

## 2024-02-13 PROCEDURE — 1160F RVW MEDS BY RX/DR IN RCRD: CPT | Performed by: FAMILY MEDICINE

## 2024-02-13 RX ADMIN — METHYLPREDNISOLONE ACETATE 80 MG: 40 INJECTION, SUSPENSION INTRA-ARTICULAR; INTRALESIONAL; INTRAMUSCULAR; INTRASYNOVIAL; SOFT TISSUE at 11:06

## 2024-02-13 RX ADMIN — ROPIVACAINE HYDROCHLORIDE 4 ML: 5 INJECTION, SOLUTION EPIDURAL; INFILTRATION; PERINEURAL at 11:06

## 2024-02-13 RX ADMIN — LIDOCAINE HYDROCHLORIDE 4 ML: 10 INJECTION, SOLUTION INFILTRATION; PERINEURAL at 11:06

## 2024-02-13 NOTE — LETTER
February 18, 2024     Christelle Gonzales MD  [address]   [address]  [facility] [address]    Patient: Eileen Myers   YOB: 1929   Date of Visit: 2/13/2024       Dear Dr. Christelle Gonzales MD:    Thank you for referring Eileen Myers to me for evaluation. Below are my notes for this consultation.  If you have questions, please do not hesitate to call me. I look forward to following your patient along with you.       Sincerely,     Morales Muller MD      CC: No Recipients  ______________________________________________________________________________________    NPV; Bilateral Knee pain, seeking injections    Patient ID: Eileen Myers is a 89 y.o. female.    L Inj/Asp: bilateral knee on 2/13/2024 11:06 AM  Indications: pain  Details: 21 G needle, ultrasound-guided superolateral approach  Medications (Right): 80 mg methylPREDNISolone acetate 40 mg/mL; 4 mL lidocaine 10 mg/mL (1 %); 4 mL ropivacaine  Medications (Left): 80 mg methylPREDNISolone acetate 40 mg/mL; 4 mL lidocaine 10 mg/mL (1 %); 4 mL ropivacaine  Outcome: tolerated well, no immediate complications  Procedure, treatment alternatives, risks and benefits explained, specific risks discussed. Consent was given by the patient. Immediately prior to procedure a time out was called to verify the correct patient, procedure, equipment, support staff and site/side marked as required. Patient was prepped and draped in the usual sterile fashion.       Sports Medicine Office Note    Today's Date:  02/13/2024     HPI: Eileen Myers is a 89 y.o. retired female who presents today for evaluation of bilateral knee pain.    Today, 2/13/2024, she presents with her adult daughter for evaluation of chronic bilateral knee pain.  These have been previously treated by her PCP.  She currently lives alone in her own residence and uses a walker for assisted ambulation outside the home.  She describes diffuse bilateral knee pain without recent or remote injury or  trauma.  She is not using any specific treatments.  She is requesting cortisone injections.    She has no other complaints.    Physical Examination:     The RIGHT knee has trace to grade 1 joint effusion. Patella crepitus and grind are positive. There is minimal tenderness to the medial and lateral joint lines. Flexion and extension are without mechanical blocking. There is no instability with stress testing.   The LEFT knee has trace to grade 1 joint effusion. Patella crepitus and grind are positive. There is minimal tenderness to the medial and lateral joint lines. Flexion and extension are without mechanical blocking. There is no instability with stress testing.   Skin - no rashes, sores, or open lesions. Strength, sensory and vascular exams are otherwise normal. There is no clubbing, cyanosis or edema.  Gait is slightly antalgic and tandem.    Imaging:  Radiographs of the right and left knees obtained today were reviewed and revealed diffuse tricompartmental osteoarthrosis bilaterally.  There are no signs of acute fractures or dislocations.  The studies were reviewed by me personally in the office today.      Procedure Note:  Procedure #1: After consent was obtained, the RIGHT knee was prepped in a sterile fashion. Ultrasound guidance was used to help insure proper needle placement into the knee joint, decrease patient discomfort, and decrease collateral damage. The joint was visualized and Depo-Medrol 80 mg with lidocaine 4 mL & ropivacaine 4 mL were injected without any complications. Ultrasound images were saved on an internal file for later reference. The patient tolerated the procedure well and the area was cleaned and bandaged.    Procedure #2: After consent was obtained, the LEFT knee was prepped in a sterile fashion. Ultrasound guidance was used to help insure proper needle placement into the knee joint, decrease patient discomfort, and decrease collateral damage. The joint was visualized and Depo-Medrol  80 mg with lidocaine 4 mL & ropivacaine 4 mL were injected without any complications. Ultrasound images were saved on an internal file for later reference. The patient tolerated the procedure well and the area was cleaned and bandaged.    Problem List Items Addressed This Visit             ICD-10-CM    Osteoarthritis of both knees - Primary M17.0    Relevant Orders    XR knee left 1-2 views (Completed)    XR knee right 3 views (Completed)    Point of Care Ultrasound (Completed)       Assessment and Plan:     We reviewed the exam and x-ray findings and discussed the conservative and surgical treatment options. We agreed upon cortisone injections to both knee joints for chronic pain.  She tolerated this well.  Activity modifications were reviewed.  She can maximize APAP and use topical diclofenac if okay with her PCP.  I am happy to see her back when the injections were off and her pain returns.    **This note was dictated using Dragon speech recognition software and was not corrected for spelling or grammatical errors**.    Morales Muller MD  Sports Medicine Specialist  [facility] [place]

## 2024-02-13 NOTE — PROGRESS NOTES
NPV; Bilateral Knee pain, seeking injections    Patient ID: Eileen Myers is a 89 y.o. female.    L Inj/Asp: bilateral knee on 2/13/2024 11:06 AM  Indications: pain  Details: 21 G needle, ultrasound-guided superolateral approach  Medications (Right): 80 mg methylPREDNISolone acetate 40 mg/mL; 4 mL lidocaine 10 mg/mL (1 %); 4 mL ropivacaine  Medications (Left): 80 mg methylPREDNISolone acetate 40 mg/mL; 4 mL lidocaine 10 mg/mL (1 %); 4 mL ropivacaine  Outcome: tolerated well, no immediate complications  Procedure, treatment alternatives, risks and benefits explained, specific risks discussed. Consent was given by the patient. Immediately prior to procedure a time out was called to verify the correct patient, procedure, equipment, support staff and site/side marked as required. Patient was prepped and draped in the usual sterile fashion.       Sports Medicine Office Note    Today's Date:  02/13/2024     HPI: Eileen Myers is a 89 y.o. retired female who presents today for evaluation of bilateral knee pain.    Today, 2/13/2024, she presents with her adult daughter for evaluation of chronic bilateral knee pain.  These have been previously treated by her PCP.  She currently lives alone in her own residence and uses a walker for assisted ambulation outside the home.  She describes diffuse bilateral knee pain without recent or remote injury or trauma.  She is not using any specific treatments.  She is requesting cortisone injections.    She has no other complaints.    Physical Examination:     The RIGHT knee has trace to grade 1 joint effusion. Patella crepitus and grind are positive. There is minimal tenderness to the medial and lateral joint lines. Flexion and extension are without mechanical blocking. There is no instability with stress testing.   The LEFT knee has trace to grade 1 joint effusion. Patella crepitus and grind are positive. There is minimal tenderness to the medial and lateral joint lines. Flexion and  extension are without mechanical blocking. There is no instability with stress testing.   Skin - no rashes, sores, or open lesions. Strength, sensory and vascular exams are otherwise normal. There is no clubbing, cyanosis or edema.  Gait is slightly antalgic and tandem.    Imaging:  Radiographs of the right and left knees obtained today were reviewed and revealed diffuse tricompartmental osteoarthrosis bilaterally.  There are no signs of acute fractures or dislocations.  The studies were reviewed by me personally in the office today.      Procedure Note:  Procedure #1: After consent was obtained, the RIGHT knee was prepped in a sterile fashion. Ultrasound guidance was used to help insure proper needle placement into the knee joint, decrease patient discomfort, and decrease collateral damage. The joint was visualized and Depo-Medrol 80 mg with lidocaine 4 mL & ropivacaine 4 mL were injected without any complications. Ultrasound images were saved on an internal file for later reference. The patient tolerated the procedure well and the area was cleaned and bandaged.    Procedure #2: After consent was obtained, the LEFT knee was prepped in a sterile fashion. Ultrasound guidance was used to help insure proper needle placement into the knee joint, decrease patient discomfort, and decrease collateral damage. The joint was visualized and Depo-Medrol 80 mg with lidocaine 4 mL & ropivacaine 4 mL were injected without any complications. Ultrasound images were saved on an internal file for later reference. The patient tolerated the procedure well and the area was cleaned and bandaged.    Problem List Items Addressed This Visit             ICD-10-CM    Osteoarthritis of both knees - Primary M17.0    Relevant Orders    XR knee left 1-2 views (Completed)    XR knee right 3 views (Completed)    Point of Care Ultrasound (Completed)       Assessment and Plan:     We reviewed the exam and x-ray findings and discussed the conservative  and surgical treatment options. We agreed upon cortisone injections to both knee joints for chronic pain.  She tolerated this well.  Activity modifications were reviewed.  She can maximize APAP and use topical diclofenac if okay with her PCP.  I am happy to see her back when the injections were off and her pain returns.    **This note was dictated using Dragon speech recognition software and was not corrected for spelling or grammatical errors**.    Morales Muller MD  Sports Medicine Specialist  [facility] [place]

## 2024-02-14 DIAGNOSIS — Z74.1 REQUIRES ASSISTANCE WITH ACTIVITIES OF DAILY LIVING (ADL): ICD-10-CM

## 2024-02-14 DIAGNOSIS — Z60.2 ELDERLY PERSON LIVING ALONE: Primary | ICD-10-CM

## 2024-02-14 DIAGNOSIS — Z78.9 IMPAIRED MOBILITY AND ADLS: ICD-10-CM

## 2024-02-14 DIAGNOSIS — Z74.09 IMPAIRED MOBILITY AND ADLS: ICD-10-CM

## 2024-02-14 SDOH — SOCIAL STABILITY - SOCIAL INSECURITY: PROBLEMS RELATED TO LIVING ALONE: Z60.2

## 2024-02-18 RX ORDER — METHYLPREDNISOLONE ACETATE 40 MG/ML
80 INJECTION, SUSPENSION INTRA-ARTICULAR; INTRALESIONAL; INTRAMUSCULAR; SOFT TISSUE
Status: COMPLETED | OUTPATIENT
Start: 2024-02-13 | End: 2024-02-13

## 2024-02-18 RX ORDER — ROPIVACAINE HYDROCHLORIDE 5 MG/ML
4 INJECTION, SOLUTION EPIDURAL; INFILTRATION; PERINEURAL
Status: COMPLETED | OUTPATIENT
Start: 2024-02-13 | End: 2024-02-13

## 2024-02-18 RX ORDER — LIDOCAINE HYDROCHLORIDE 10 MG/ML
4 INJECTION INFILTRATION; PERINEURAL
Status: COMPLETED | OUTPATIENT
Start: 2024-02-13 | End: 2024-02-13

## 2024-03-12 ENCOUNTER — TELEPHONE (OUTPATIENT)
Dept: PULMONOLOGY | Facility: CLINIC | Age: 89
End: 2024-03-12
Payer: COMMERCIAL

## 2024-03-12 DIAGNOSIS — J45.909 ASTHMA, UNSPECIFIED ASTHMA SEVERITY, UNSPECIFIED WHETHER COMPLICATED, UNSPECIFIED WHETHER PERSISTENT (HHS-HCC): Primary | ICD-10-CM

## 2024-03-12 RX ORDER — FLUTICASONE PROPIONATE 220 UG/1
2 AEROSOL, METERED RESPIRATORY (INHALATION)
Qty: 12 G | Refills: 11 | Status: SHIPPED | OUTPATIENT
Start: 2024-03-12 | End: 2024-05-01 | Stop reason: WASHOUT

## 2024-03-12 NOTE — TELEPHONE ENCOUNTER
Patient sts she believes the Qvar is making her eyes run.    She is requesting to go on the generic formulation of the Flovent if they make it.

## 2024-03-13 ENCOUNTER — TELEPHONE (OUTPATIENT)
Dept: PRIMARY CARE | Facility: CLINIC | Age: 89
End: 2024-03-13
Payer: COMMERCIAL

## 2024-03-14 ENCOUNTER — HOME HEALTH ADMISSION (OUTPATIENT)
Dept: HOME HEALTH SERVICES | Facility: HOME HEALTH | Age: 89
End: 2024-03-14
Payer: COMMERCIAL

## 2024-03-14 ENCOUNTER — TELEPHONE (OUTPATIENT)
Dept: HOME HEALTH SERVICES | Facility: HOME HEALTH | Age: 89
End: 2024-03-14

## 2024-03-14 ENCOUNTER — DOCUMENTATION (OUTPATIENT)
Dept: HOME HEALTH SERVICES | Facility: HOME HEALTH | Age: 89
End: 2024-03-14
Payer: COMMERCIAL

## 2024-03-14 DIAGNOSIS — Z74.1 REQUIRES ASSISTANCE WITH ACTIVITIES OF DAILY LIVING (ADL): ICD-10-CM

## 2024-03-14 DIAGNOSIS — Z91.81 AT HIGH RISK FOR FALLS: Primary | ICD-10-CM

## 2024-03-14 DIAGNOSIS — M15.9 GOA (GENERALIZED OSTEOARTHRITIS): ICD-10-CM

## 2024-03-14 NOTE — HH CARE COORDINATION
Home Care received a referral for Home Health Aide. Unfortunately, we are unable to accept and process the referral at this time.    Patients, please reach out to the referring provider or your PCP to assist in obtaining an alternative home care agency and/or guidance to meet your needs.    Providers, please reach out to  Home Care with any questions regarding the declined referral.

## 2024-03-14 NOTE — TELEPHONE ENCOUNTER
This referral has been made a Non Admit with  Home Care due to No Skilled Disciplines Ordered. If you have further questions, feel free to reach out to our office at [phone]. Thank you, [facility] Int[facility].

## 2024-04-01 ENCOUNTER — HOME HEALTH ADMISSION (OUTPATIENT)
Dept: HOME HEALTH SERVICES | Facility: HOME HEALTH | Age: 89
End: 2024-04-01
Payer: COMMERCIAL

## 2024-04-01 ENCOUNTER — DOCUMENTATION (OUTPATIENT)
Dept: HOME HEALTH SERVICES | Facility: HOME HEALTH | Age: 89
End: 2024-04-01
Payer: COMMERCIAL

## 2024-04-01 ENCOUNTER — TELEPHONE (OUTPATIENT)
Dept: PRIMARY CARE | Facility: CLINIC | Age: 89
End: 2024-04-01
Payer: COMMERCIAL

## 2024-04-01 ENCOUNTER — TELEPHONE (OUTPATIENT)
Dept: HOME HEALTH SERVICES | Facility: HOME HEALTH | Age: 89
End: 2024-04-01

## 2024-04-01 DIAGNOSIS — Z74.1 REQUIRES ASSISTANCE WITH ACTIVITIES OF DAILY LIVING (ADL): Primary | ICD-10-CM

## 2024-04-01 DIAGNOSIS — Z60.2 LIVES ALONE WITHOUT HELP AVAILABLE: ICD-10-CM

## 2024-04-01 SDOH — SOCIAL STABILITY - SOCIAL INSECURITY: PROBLEMS RELATED TO LIVING ALONE: Z60.2

## 2024-04-01 NOTE — TELEPHONE ENCOUNTER
This referral has been made a Non Admit with  Home Care due to No Skilled Disciplines Ordered. If you have further questions, feel free to reach out to our office at [phone]. Thank you, [facility] Intake.

## 2024-05-01 ENCOUNTER — TELEPHONE (OUTPATIENT)
Dept: PULMONOLOGY | Facility: CLINIC | Age: 89
End: 2024-05-01
Payer: COMMERCIAL

## 2024-05-01 DIAGNOSIS — J45.909 UNSPECIFIED ASTHMA, UNCOMPLICATED (HHS-HCC): ICD-10-CM

## 2024-05-01 NOTE — TELEPHONE ENCOUNTER
Mrs. Myers switched pharmacies and also need a refill on the Flovent inhaler, but they do not have it in the generic formulation.  Is there something else she can take?

## 2024-05-07 ENCOUNTER — TELEPHONE (OUTPATIENT)
Dept: PULMONOLOGY | Facility: CLINIC | Age: 89
End: 2024-05-07
Payer: COMMERCIAL

## 2024-05-07 NOTE — TELEPHONE ENCOUNTER
Patient sts she is following up with you regarding an inhaler that was discussed with you. She sts you were to call her back. Please call

## 2024-05-13 ENCOUNTER — TELEPHONE (OUTPATIENT)
Dept: PULMONOLOGY | Facility: CLINIC | Age: 89
End: 2024-05-13
Payer: COMMERCIAL

## 2024-05-13 DIAGNOSIS — J45.909 UNSPECIFIED ASTHMA, UNCOMPLICATED (HHS-HCC): ICD-10-CM

## 2024-05-13 RX ORDER — FLUTICASONE PROPIONATE 110 UG/1
1 AEROSOL, METERED RESPIRATORY (INHALATION)
Qty: 12 G | Refills: 11 | Status: SHIPPED | OUTPATIENT
Start: 2024-05-13 | End: 2025-05-13

## 2024-05-13 NOTE — PROGRESS NOTES
Patient does not like to Qvar I will try Flovent 110 2 puffs twice daily.  I am not sure that we will be on her formulary

## 2024-05-13 NOTE — TELEPHONE ENCOUNTER
Patient states the inhaler sent to the pharmacy was the same one given before.     Would like a return call

## 2024-05-16 ENCOUNTER — TELEPHONE (OUTPATIENT)
Dept: PULMONOLOGY | Facility: CLINIC | Age: 89
End: 2024-05-16
Payer: COMMERCIAL

## 2024-05-16 NOTE — TELEPHONE ENCOUNTER
Fluticasone HFA 110mcg is not covered under the patients plan.    Alternatives:  Arnuity Ellipta  Qvar    Please advise

## 2024-05-21 ENCOUNTER — TELEPHONE (OUTPATIENT)
Dept: PRIMARY CARE | Facility: CLINIC | Age: 89
End: 2024-05-21
Payer: COMMERCIAL

## 2024-05-21 NOTE — TELEPHONE ENCOUNTER
Pt need a verbal order For home care with her current Med list and Diagnosis faxed over to [phone] please and thank you

## 2024-06-03 ENCOUNTER — OFFICE VISIT (OUTPATIENT)
Dept: PULMONOLOGY | Facility: CLINIC | Age: 89
End: 2024-06-03
Payer: COMMERCIAL

## 2024-06-03 ENCOUNTER — APPOINTMENT (OUTPATIENT)
Dept: PRIMARY CARE | Facility: CLINIC | Age: 89
End: 2024-06-03
Payer: COMMERCIAL

## 2024-06-03 VITALS
TEMPERATURE: 97.1 F | HEART RATE: 69 BPM | BODY MASS INDEX: 35.08 KG/M2 | RESPIRATION RATE: 18 BRPM | OXYGEN SATURATION: 98 % | DIASTOLIC BLOOD PRESSURE: 58 MMHG | WEIGHT: 179.6 LBS | SYSTOLIC BLOOD PRESSURE: 141 MMHG

## 2024-06-03 DIAGNOSIS — J45.40 MODERATE PERSISTENT ASTHMA WITHOUT COMPLICATION (HHS-HCC): Primary | ICD-10-CM

## 2024-06-03 PROCEDURE — 1159F MED LIST DOCD IN RCRD: CPT | Performed by: INTERNAL MEDICINE

## 2024-06-03 PROCEDURE — 1160F RVW MEDS BY RX/DR IN RCRD: CPT | Performed by: INTERNAL MEDICINE

## 2024-06-03 PROCEDURE — 99214 OFFICE O/P EST MOD 30 MIN: CPT | Performed by: INTERNAL MEDICINE

## 2024-06-03 PROCEDURE — 3078F DIAST BP <80 MM HG: CPT | Performed by: INTERNAL MEDICINE

## 2024-06-03 PROCEDURE — 3077F SYST BP >= 140 MM HG: CPT | Performed by: INTERNAL MEDICINE

## 2024-06-03 PROCEDURE — 1036F TOBACCO NON-USER: CPT | Performed by: INTERNAL MEDICINE

## 2024-06-03 ASSESSMENT — ENCOUNTER SYMPTOMS
SHORTNESS OF BREATH: 1
NAUSEA: 0
SLEEP DISTURBANCE: 0
TREMORS: 0
ARTHRALGIAS: 0
DYSURIA: 0
SPEECH DIFFICULTY: 0
LIGHT-HEADEDNESS: 0
COUGH: 1
HEMATURIA: 0
FACIAL SWELLING: 0
APNEA: 0
BRUISES/BLEEDS EASILY: 0
HEADACHES: 0
RHINORRHEA: 0
FATIGUE: 0
EYE DISCHARGE: 0
DIFFICULTY URINATING: 0
ABDOMINAL PAIN: 0
CHOKING: 0
CONSTIPATION: 0
WEAKNESS: 0
PALPITATIONS: 0
FEVER: 0
NUMBNESS: 0
ADENOPATHY: 0
JOINT SWELLING: 0
DIZZINESS: 0
SINUS PAIN: 0
AGITATION: 0
STRIDOR: 0
EYE REDNESS: 0
WHEEZING: 0
UNEXPECTED WEIGHT CHANGE: 0
NERVOUS/ANXIOUS: 0
ABDOMINAL DISTENTION: 0
FREQUENCY: 0
SINUS PRESSURE: 0

## 2024-06-06 ENCOUNTER — APPOINTMENT (OUTPATIENT)
Dept: PRIMARY CARE | Facility: CLINIC | Age: 89
End: 2024-06-06
Payer: COMMERCIAL

## 2024-06-13 ENCOUNTER — TELEPHONE (OUTPATIENT)
Dept: PRIMARY CARE | Facility: CLINIC | Age: 89
End: 2024-06-13
Payer: COMMERCIAL

## 2024-06-28 DIAGNOSIS — E87.6 HYPOKALEMIA: ICD-10-CM

## 2024-07-01 RX ORDER — POTASSIUM CHLORIDE 20 MEQ/1
20 TABLET, EXTENDED RELEASE ORAL 2 TIMES DAILY
Qty: 180 TABLET | Refills: 3 | Status: SHIPPED | OUTPATIENT
Start: 2024-07-01

## 2024-07-02 DIAGNOSIS — I10 PRIMARY HYPERTENSION: ICD-10-CM

## 2024-07-06 RX ORDER — LOSARTAN POTASSIUM 50 MG/1
50 TABLET ORAL DAILY
Qty: 90 TABLET | Refills: 1 | Status: SHIPPED | OUTPATIENT
Start: 2024-07-06

## 2024-07-11 ENCOUNTER — APPOINTMENT (OUTPATIENT)
Dept: PRIMARY CARE | Facility: CLINIC | Age: 89
End: 2024-07-11
Payer: COMMERCIAL

## 2024-07-11 VITALS
HEART RATE: 83 BPM | WEIGHT: 174.4 LBS | BODY MASS INDEX: 34.24 KG/M2 | DIASTOLIC BLOOD PRESSURE: 69 MMHG | SYSTOLIC BLOOD PRESSURE: 142 MMHG | HEIGHT: 60 IN | OXYGEN SATURATION: 97 %

## 2024-07-11 DIAGNOSIS — N63.32 UNSPECIFIED LUMP IN AXILLARY TAIL OF THE LEFT BREAST: ICD-10-CM

## 2024-07-11 DIAGNOSIS — R26.2 DIFFICULTY IN WALKING: ICD-10-CM

## 2024-07-11 DIAGNOSIS — R22.32 AXILLARY MASS, LEFT: Primary | ICD-10-CM

## 2024-07-11 DIAGNOSIS — Z74.1 REQUIRES ASSISTANCE WITH ACTIVITIES OF DAILY LIVING (ADL): ICD-10-CM

## 2024-07-11 DIAGNOSIS — R54 FRAIL ELDERLY: ICD-10-CM

## 2024-07-11 DIAGNOSIS — N18.31 STAGE 3A CHRONIC KIDNEY DISEASE (MULTI): ICD-10-CM

## 2024-07-11 DIAGNOSIS — J44.9 CHRONIC OBSTRUCTIVE PULMONARY DISEASE, UNSPECIFIED COPD TYPE (MULTI): ICD-10-CM

## 2024-07-11 PROCEDURE — 1036F TOBACCO NON-USER: CPT | Performed by: STUDENT IN AN ORGANIZED HEALTH CARE EDUCATION/TRAINING PROGRAM

## 2024-07-11 PROCEDURE — 1159F MED LIST DOCD IN RCRD: CPT | Performed by: STUDENT IN AN ORGANIZED HEALTH CARE EDUCATION/TRAINING PROGRAM

## 2024-07-11 PROCEDURE — 1160F RVW MEDS BY RX/DR IN RCRD: CPT | Performed by: STUDENT IN AN ORGANIZED HEALTH CARE EDUCATION/TRAINING PROGRAM

## 2024-07-11 PROCEDURE — 3077F SYST BP >= 140 MM HG: CPT | Performed by: STUDENT IN AN ORGANIZED HEALTH CARE EDUCATION/TRAINING PROGRAM

## 2024-07-11 PROCEDURE — 3078F DIAST BP <80 MM HG: CPT | Performed by: STUDENT IN AN ORGANIZED HEALTH CARE EDUCATION/TRAINING PROGRAM

## 2024-07-11 PROCEDURE — 99214 OFFICE O/P EST MOD 30 MIN: CPT | Performed by: STUDENT IN AN ORGANIZED HEALTH CARE EDUCATION/TRAINING PROGRAM

## 2024-07-11 RX ORDER — DOXYCYCLINE 100 MG/1
100 CAPSULE ORAL 2 TIMES DAILY
Qty: 20 CAPSULE | Refills: 0 | Status: SHIPPED | OUTPATIENT
Start: 2024-07-11 | End: 2024-07-21

## 2024-07-11 NOTE — PROGRESS NOTES
"Subjective   Patient ID: Eileen Myers is a 89 y.o. female who presents for Follow-up (5 month follow-up. Discuss HHA. ).        HPI    89 y/o female with HTN, HPL , asthma,  Thyroid and  Pulm nodules, mild fusiform dilatation of the ascending thoracic aorta, Cervical Radiculopathy, GOA , ckd stage 3    Chronic left axillary mass , reports worsening pain   Was told that it could be \" epidermal cyst\"     Pt also asks abt HHA              Visit Vitals  /69   Pulse 83   Ht 1.524 m (5')   Wt 79.1 kg (174 lb 6.4 oz)   SpO2 97%   BMI 34.06 kg/m²   Smoking Status Never   BSA 1.83 m²      No LMP recorded.   Current Outpatient Medications   Medication Instructions    albuterol 2.5 mg /3 mL (0.083 %) nebulizer solution INHALE THE CONTENTS OF 1 VIAL VIA NEBULIZER FOUR TIMES DAILY AS NEEDED.    atorvastatin (LIPITOR) 40 mg, oral, Nightly    carvedilol (COREG) 6.25 mg, oral, 2 times daily (morning and late afternoon)    cholecalciferol (Vitamin D-3) 10 MCG (400 UNIT) tablet oral    cyanocobalamin (Vitamin B-12) 1,000 mcg tablet oral    diclofenac sodium (VOLTAREN) 4 g, Topical, 4 times daily PRN    doxycycline (VIBRAMYCIN) 100 mg, oral, 2 times daily, Take with at least 8 ounces (large glass) of water, do not lie down for 30 minutes after    esomeprazole (NEXIUM) 40 mg, oral, Daily    fluticasone (Flonase) 50 mcg/actuation nasal spray INSTILL 1 SQUIRT TWICE DAILY IN EACH NOSTRIL    fluticasone (Flovent) 110 mcg/actuation inhaler 1 puff, inhalation, 2 times daily RT, Rinse mouth with water after use to reduce aftertaste and incidence of candidiasis. Do not swallow.    hydroCHLOROthiazide (HYDRODIURIL) 25 mg, oral, Daily    inhalational spacing device (BreatheRite MDI Spacer) inhaler Use as instructed    losartan (COZAAR) 50 mg, oral, Daily    montelukast (SINGULAIR) 10 mg, oral, Every evening    potassium chloride CR 20 mEq ER tablet 20 mEq, oral, 2 times daily, Do not crush or chew.      Social History     Tobacco Use    " Smoking status: Never    Smokeless tobacco: Never   Substance Use Topics    Alcohol use: Not Currently        Review of Systems    Constitutional : No feeling poorly / fevers/ chills / night sweats/ fatigue   Cardiovascular : No CP /Palpitations/ lower extremity edema / syncope   Respiratory : No Cough /DESIR/Dyspnea at rest   Gastrointestinal : No abd pain / N/V  No bloody stools/ melena / constipation  Endo : No polyuria/polydipsia/ muscle weakness / sluggishness   CNS: No confusion / HA/ tingling/ numbness/ weakness of extremities  Psychiatric: No anxiety/ depression/ SI/HI    All other systems have been reviewed and are negative for complaint       Physical Exam    Constitutional : Vitals reviewed. Alert and in no distress  Cardiovascular : RRR, Normal S1, S2, No pericardial rub/ gallop, no peripheral edema   Pulmonary: No respiratory distress, CTAB   MSK : Normal gait and station , strength and tone   Skin: Warm to touch ,  normal skin turgor   Neurologic : CNs 2-12 grossly intact , no obvious FNDs  Psych : A,Ox3, normal mood and affect      Assessment/Plan   Diagnoses and all orders for this visit:  Axillary mass, left  -     doxycycline (Vibramycin) 100 mg capsule; Take 1 capsule (100 mg) by mouth 2 times a day for 10 days. Take with at least 8 ounces (large glass) of water, do not lie down for 30 minutes after  Unspecified lump in axillary tail of the left breast  -     BI mammo right diagnostic tomosynthesis; Future  -     BI US breast complete left; Future  Stage 3a chronic kidney disease (Multi)  Chronic obstructive pulmonary disease, unspecified COPD type (Multi)  Requires assistance with activities of daily living (ADL)  Difficulty in walking  Frail elderly        89 y/o female with HTN, HPL , asthma,  Thyroid and  Pulm nodules, mild fusiform dilatation of the ascending thoracic aorta, Cervical Radiculopathy, GOA , ckd stage 3    Left axillary mass  ;raised nodular, erythematous on the medial side with  central excavation with black deposit .   ? Chronic LAD vs inflamed epidermal cyst   Rx with abx but will get diagnostic mamm of left breast as well     Pt will continue to benefit from a HHA given her advancing age and age related morbidity limiting her aDLS .       RTO in 2m     Conditions addressed and mgmt as noted above.  Pertinent labs, images/ imaging reports , chart review was done .   Age appropriate labs / labs for mgmt of chronic medical conditions ordered, further mgmt pending the results.

## 2024-07-19 ENCOUNTER — HOSPITAL ENCOUNTER (OUTPATIENT)
Dept: RADIOLOGY | Facility: CLINIC | Age: 89
Discharge: HOME | End: 2024-07-19
Payer: COMMERCIAL

## 2024-07-19 ENCOUNTER — APPOINTMENT (OUTPATIENT)
Dept: RADIOLOGY | Facility: CLINIC | Age: 89
End: 2024-07-19
Payer: COMMERCIAL

## 2024-07-19 VITALS — WEIGHT: 174.38 LBS | BODY MASS INDEX: 34.24 KG/M2 | HEIGHT: 60 IN

## 2024-07-19 DIAGNOSIS — N63.32 UNSPECIFIED LUMP IN AXILLARY TAIL OF THE LEFT BREAST: ICD-10-CM

## 2024-07-19 PROCEDURE — 76642 ULTRASOUND BREAST LIMITED: CPT | Mod: LT

## 2024-07-19 PROCEDURE — 76982 USE 1ST TARGET LESION: CPT | Mod: LT

## 2024-07-19 PROCEDURE — 77066 DX MAMMO INCL CAD BI: CPT

## 2024-07-26 DIAGNOSIS — I10 ESSENTIAL (PRIMARY) HYPERTENSION: ICD-10-CM

## 2024-07-26 DIAGNOSIS — I10 PRIMARY HYPERTENSION: ICD-10-CM

## 2024-07-26 RX ORDER — HYDROCHLOROTHIAZIDE 25 MG/1
25 TABLET ORAL DAILY
Qty: 90 TABLET | Refills: 1 | Status: SHIPPED | OUTPATIENT
Start: 2024-07-26

## 2024-07-26 RX ORDER — CARVEDILOL 6.25 MG/1
6.25 TABLET ORAL
Qty: 180 TABLET | Refills: 3 | Status: SHIPPED | OUTPATIENT
Start: 2024-07-26

## 2024-08-06 ENCOUNTER — TELEPHONE (OUTPATIENT)
Dept: PRIMARY CARE | Facility: CLINIC | Age: 89
End: 2024-08-06

## 2024-09-09 ENCOUNTER — APPOINTMENT (OUTPATIENT)
Dept: PRIMARY CARE | Facility: CLINIC | Age: 89
End: 2024-09-09
Payer: COMMERCIAL

## 2024-09-09 ENCOUNTER — OFFICE VISIT (OUTPATIENT)
Dept: PULMONOLOGY | Facility: CLINIC | Age: 89
End: 2024-09-09
Payer: COMMERCIAL

## 2024-09-09 ENCOUNTER — TELEPHONE (OUTPATIENT)
Dept: PRIMARY CARE | Facility: CLINIC | Age: 89
End: 2024-09-09

## 2024-09-09 VITALS
OXYGEN SATURATION: 96 % | DIASTOLIC BLOOD PRESSURE: 60 MMHG | SYSTOLIC BLOOD PRESSURE: 130 MMHG | HEART RATE: 82 BPM | WEIGHT: 168.4 LBS | HEIGHT: 60 IN | BODY MASS INDEX: 33.06 KG/M2

## 2024-09-09 VITALS
TEMPERATURE: 97.3 F | SYSTOLIC BLOOD PRESSURE: 123 MMHG | OXYGEN SATURATION: 98 % | HEART RATE: 84 BPM | WEIGHT: 174 LBS | BODY MASS INDEX: 33.98 KG/M2 | DIASTOLIC BLOOD PRESSURE: 58 MMHG | RESPIRATION RATE: 16 BRPM

## 2024-09-09 DIAGNOSIS — R32 URINARY INCONTINENCE, UNSPECIFIED TYPE: Primary | ICD-10-CM

## 2024-09-09 DIAGNOSIS — R26.2 DIFFICULTY WALKING: ICD-10-CM

## 2024-09-09 DIAGNOSIS — J44.9 CHRONIC OBSTRUCTIVE PULMONARY DISEASE, UNSPECIFIED COPD TYPE (MULTI): Primary | ICD-10-CM

## 2024-09-09 DIAGNOSIS — L85.9 HK (HYPERKERATOSIS): ICD-10-CM

## 2024-09-09 PROCEDURE — 3078F DIAST BP <80 MM HG: CPT | Performed by: STUDENT IN AN ORGANIZED HEALTH CARE EDUCATION/TRAINING PROGRAM

## 2024-09-09 PROCEDURE — 1036F TOBACCO NON-USER: CPT | Performed by: INTERNAL MEDICINE

## 2024-09-09 PROCEDURE — 99213 OFFICE O/P EST LOW 20 MIN: CPT | Performed by: INTERNAL MEDICINE

## 2024-09-09 PROCEDURE — 3074F SYST BP LT 130 MM HG: CPT | Performed by: INTERNAL MEDICINE

## 2024-09-09 PROCEDURE — 1159F MED LIST DOCD IN RCRD: CPT | Performed by: INTERNAL MEDICINE

## 2024-09-09 PROCEDURE — 1159F MED LIST DOCD IN RCRD: CPT | Performed by: STUDENT IN AN ORGANIZED HEALTH CARE EDUCATION/TRAINING PROGRAM

## 2024-09-09 PROCEDURE — 1036F TOBACCO NON-USER: CPT | Performed by: STUDENT IN AN ORGANIZED HEALTH CARE EDUCATION/TRAINING PROGRAM

## 2024-09-09 PROCEDURE — 1160F RVW MEDS BY RX/DR IN RCRD: CPT | Performed by: STUDENT IN AN ORGANIZED HEALTH CARE EDUCATION/TRAINING PROGRAM

## 2024-09-09 PROCEDURE — 3078F DIAST BP <80 MM HG: CPT | Performed by: INTERNAL MEDICINE

## 2024-09-09 PROCEDURE — 1160F RVW MEDS BY RX/DR IN RCRD: CPT | Performed by: INTERNAL MEDICINE

## 2024-09-09 PROCEDURE — 3075F SYST BP GE 130 - 139MM HG: CPT | Performed by: STUDENT IN AN ORGANIZED HEALTH CARE EDUCATION/TRAINING PROGRAM

## 2024-09-09 PROCEDURE — 99213 OFFICE O/P EST LOW 20 MIN: CPT | Performed by: STUDENT IN AN ORGANIZED HEALTH CARE EDUCATION/TRAINING PROGRAM

## 2024-09-09 RX ORDER — TRIAMCINOLONE ACETONIDE 5 MG/G
OINTMENT TOPICAL 2 TIMES DAILY
Qty: 15 G | Refills: 0 | Status: SHIPPED | OUTPATIENT
Start: 2024-09-09 | End: 2025-09-09

## 2024-09-09 RX ORDER — INCONTINENCE PAD,LINER,DISP
EACH MISCELLANEOUS
Qty: 96 EACH | Refills: 0 | Status: SHIPPED | OUTPATIENT
Start: 2024-09-09

## 2024-09-09 RX ORDER — INCONTINENCE PAD,LINER,DISP
EACH MISCELLANEOUS
Qty: 96 EACH | Refills: 0 | Status: SHIPPED | OUTPATIENT
Start: 2024-09-09 | End: 2024-09-09

## 2024-09-09 ASSESSMENT — ENCOUNTER SYMPTOMS
WHEEZING: 0
NAUSEA: 0
DIFFICULTY URINATING: 0
NUMBNESS: 0
BRUISES/BLEEDS EASILY: 0
EYE REDNESS: 0
WEAKNESS: 1
NERVOUS/ANXIOUS: 0
PALPITATIONS: 0
FEVER: 0
COUGH: 1
SHORTNESS OF BREATH: 0
CONSTIPATION: 0
SLEEP DISTURBANCE: 0
LIGHT-HEADEDNESS: 0
FATIGUE: 0
EYE DISCHARGE: 0
DYSURIA: 0
AGITATION: 0
APNEA: 0
ARTHRALGIAS: 0
TREMORS: 0
HEADACHES: 0
SINUS PAIN: 0
FREQUENCY: 0
JOINT SWELLING: 0
ABDOMINAL PAIN: 0
UNEXPECTED WEIGHT CHANGE: 0
FACIAL SWELLING: 0
SINUS PRESSURE: 0
HEMATURIA: 0
DIZZINESS: 0
SPEECH DIFFICULTY: 0
ADENOPATHY: 0
CHOKING: 0
RHINORRHEA: 0
STRIDOR: 0
ABDOMINAL DISTENTION: 0
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
## 2024-09-09 NOTE — PROGRESS NOTES
Subjective   Patient ID: Eileen Myers is a 89 y.o. female who presents for Follow-up (2 month follow-up. Discuss referral for a ramp for home and depends. Pt declined flu shot. ).        HPI  89 y/o female with HTN, HPL , asthma,  Thyroid and  Pulm nodules, mild fusiform dilatation of the ascending thoracic aorta, Cervical Radiculopathy, GOA , ckd stage 3       Son drove her to the office today .  Her HHA accompanied her as well, and is in the waiting room  She is here by herself today in the exam room.  She always seems to be upset and has an internal fixation at every visit.  She asks why she was not informed of her ultrasound results.  I reviewed the recent annotation of her mammogram results.  Staff was not able to get in touch with her hence the results were emailed.  She would like a prescription for  Incontinence supplies and requests a referral for ramp referral    Visit Vitals  /60   Pulse 82   Ht 1.524 m (5')   Wt 76.4 kg (168 lb 6.4 oz)   SpO2 96%   BMI 32.89 kg/m²   OB Status Postmenopausal   Smoking Status Never   BSA 1.8 m²      No LMP recorded. Patient is postmenopausal.   Current Outpatient Medications   Medication Instructions    albuterol 2.5 mg /3 mL (0.083 %) nebulizer solution INHALE THE CONTENTS OF 1 VIAL VIA NEBULIZER FOUR TIMES DAILY AS NEEDED.    atorvastatin (LIPITOR) 40 mg, oral, Nightly    carvedilol (COREG) 6.25 mg, oral, 2 times daily (morning and late afternoon)    cholecalciferol (Vitamin D-3) 10 MCG (400 UNIT) tablet oral    cyanocobalamin (Vitamin B-12) 1,000 mcg tablet oral    diclofenac sodium (VOLTAREN) 4 g, Topical, 4 times daily PRN    esomeprazole (NEXIUM) 40 mg, oral, Daily    fluticasone (Flonase) 50 mcg/actuation nasal spray INSTILL 1 SQUIRT TWICE DAILY IN EACH NOSTRIL    fluticasone (Flovent) 110 mcg/actuation inhaler 1 puff, inhalation, 2 times daily RT, Rinse mouth with water after use to reduce aftertaste and incidence of candidiasis. Do not swallow.     hydroCHLOROthiazide (HYDRODIURIL) 25 mg, oral, Daily    inhalational spacing device (BreatheRite MDI Spacer) inhaler Use as instructed    losartan (COZAAR) 50 mg, oral, Daily    montelukast (SINGULAIR) 10 mg, oral, Every evening    potassium chloride CR 20 mEq ER tablet 20 mEq, oral, 2 times daily, Do not crush or chew.      Social History     Tobacco Use    Smoking status: Never    Smokeless tobacco: Never   Substance Use Topics    Alcohol use: Not Currently        Review of Systems    Constitutional : No feeling poorly / fevers/ chills / night sweats/ fatigue   Cardiovascular : No CP /Palpitations/ lower extremity edema / syncope   Respiratory : No Cough /DESIR/Dyspnea at rest   Gastrointestinal : No abd pain / N/V  No bloody stools/ melena / constipation  Endo : No polyuria/polydipsia/ muscle weakness / sluggishness   CNS: No confusion / HA/ tingling/ numbness/ weakness of extremities  Psychiatric: No anxiety/ depression/ SI/HI    All other systems have been reviewed and are negative for complaint       Physical Exam    Constitutional : Vitals reviewed. Alert and in no distress  Cardiovascular : RRR, Normal S1, S2, No pericardial rub/ gallop, no peripheral edema   Pulmonary: No respiratory distress, CTAB   MSK : Normal gait and station , strength and tone   Skin: Warm to touch ,  normal skin turgor   Neurologic : CNs 2-12 grossly intact , no obvious FNDs  Psych : A,Ox3, normal mood and affect      Assessment/Plan   Diagnoses and all orders for this visit:  Urinary incontinence, unspecified type  -     diaper,brief,adult,disposable (Depend Easy Fit Undergarments) misc; Use as needed for urinary incontinence        89 y/o female with HTN, HPL , asthma,  Thyroid and  Pulm nodules, mild fusiform dilatation of the ascending thoracic aorta, Cervical Radiculopathy, GOA , ckd stage 3       Review of benefit for me as well as per the patient if a family member accompanies her to the visit and is with her in the office  room/exam room.  There is also concern for cognitive decline given that she does not seem to comprehend.  Incontinence supplies prescriptions given.  I am not sure if there is any prescription for a ramp but I will be glad to provide a letter that she would benefit from having a ramp to help for ambulation in and out of the house.      Conditions addressed and mgmt as noted above.  Pertinent labs, images/ imaging reports , chart review was done .   Age appropriate labs / labs for mgmt of chronic medical conditions ordered, further mgmt pending the results.       This note is intended for the physician writing it, as well as to communicate findings to other healthcare professionals. These notes use medical lexicon that may be misunderstood by non medical persons. Therefore, interpretations of medical notes and terminology should be approached with caution.

## 2024-09-10 PROBLEM — H26.499 AFTER CATARACT NOT OBSCURING VISION: Status: ACTIVE | Noted: 2022-04-26

## 2024-09-10 PROBLEM — R91.8 LUNG MASS: Status: ACTIVE | Noted: 2023-12-11

## 2024-09-10 PROBLEM — R74.8 ELEVATED CREATINE KINASE LEVEL: Status: ACTIVE | Noted: 2024-09-10

## 2024-09-10 PROBLEM — M79.643 PAIN OF HAND: Status: ACTIVE | Noted: 2024-09-10

## 2024-09-10 PROBLEM — H04.123 DRY EYES: Status: ACTIVE | Noted: 2023-12-11

## 2024-09-10 PROBLEM — H43.813 PVD (POSTERIOR VITREOUS DETACHMENT), BOTH EYES: Status: ACTIVE | Noted: 2024-04-25

## 2024-09-10 PROBLEM — H43.819 VITREOUS DEGENERATION: Status: ACTIVE | Noted: 2022-04-26

## 2024-09-10 PROBLEM — R07.9 CHEST PAIN: Status: ACTIVE | Noted: 2024-09-10

## 2024-09-10 PROBLEM — H40.019 OPEN ANGLE GLAUCOMA SUSPECT WITH BORDERLINE FINDINGS AT LOW RISK: Status: ACTIVE | Noted: 2022-04-26

## 2024-09-10 PROBLEM — H04.129 DRY EYE SYNDROME: Status: ACTIVE | Noted: 2022-04-26

## 2024-09-10 PROBLEM — H04.209 EPIPHORA: Status: ACTIVE | Noted: 2022-04-26

## 2024-09-10 PROBLEM — H52.209 ASTIGMATISM: Status: ACTIVE | Noted: 2022-04-26

## 2024-09-10 PROBLEM — H43.22 ASTEROID HYALOSIS OF LEFT EYE: Status: ACTIVE | Noted: 2024-04-25

## 2024-09-10 PROBLEM — J32.9 SINUSITIS: Status: ACTIVE | Noted: 2024-09-10

## 2024-09-10 PROBLEM — H02.839 DERMATOCHALASIS: Status: ACTIVE | Noted: 2022-04-26

## 2024-09-10 PROBLEM — H52.4 PRESBYOPIA OF BOTH EYES: Status: ACTIVE | Noted: 2022-04-26

## 2024-09-10 RX ORDER — BECLOMETHASONE DIPROPIONATE HFA 40 UG/1
AEROSOL, METERED RESPIRATORY (INHALATION)
COMMUNITY
Start: 2024-07-08

## 2024-09-18 DIAGNOSIS — E78.5 HYPERLIPIDEMIA, UNSPECIFIED: ICD-10-CM

## 2024-09-19 DIAGNOSIS — E78.5 HYPERLIPIDEMIA, UNSPECIFIED: ICD-10-CM

## 2024-09-22 RX ORDER — ATORVASTATIN CALCIUM 40 MG/1
40 TABLET, FILM COATED ORAL NIGHTLY
Qty: 90 TABLET | Refills: 3 | OUTPATIENT
Start: 2024-09-22

## 2024-09-22 RX ORDER — ATORVASTATIN CALCIUM 40 MG/1
40 TABLET, FILM COATED ORAL NIGHTLY
Qty: 90 TABLET | Refills: 3 | Status: SHIPPED | OUTPATIENT
Start: 2024-09-22

## 2024-10-16 ENCOUNTER — TELEPHONE (OUTPATIENT)
Dept: PRIMARY CARE | Facility: CLINIC | Age: 89
End: 2024-10-16
Payer: COMMERCIAL

## 2024-12-09 ENCOUNTER — APPOINTMENT (OUTPATIENT)
Dept: PRIMARY CARE | Facility: CLINIC | Age: 89
End: 2024-12-09
Payer: COMMERCIAL

## 2024-12-09 ENCOUNTER — OFFICE VISIT (OUTPATIENT)
Dept: PULMONOLOGY | Facility: CLINIC | Age: 89
End: 2024-12-09
Payer: COMMERCIAL

## 2024-12-09 VITALS
WEIGHT: 171.8 LBS | OXYGEN SATURATION: 98 % | SYSTOLIC BLOOD PRESSURE: 148 MMHG | HEART RATE: 85 BPM | TEMPERATURE: 97.1 F | DIASTOLIC BLOOD PRESSURE: 67 MMHG | BODY MASS INDEX: 33.55 KG/M2 | RESPIRATION RATE: 16 BRPM

## 2024-12-09 VITALS
WEIGHT: 172.2 LBS | BODY MASS INDEX: 33.81 KG/M2 | DIASTOLIC BLOOD PRESSURE: 70 MMHG | OXYGEN SATURATION: 98 % | HEIGHT: 60 IN | HEART RATE: 88 BPM | SYSTOLIC BLOOD PRESSURE: 150 MMHG

## 2024-12-09 DIAGNOSIS — Z00.00 MEDICARE ANNUAL WELLNESS VISIT, SUBSEQUENT: ICD-10-CM

## 2024-12-09 DIAGNOSIS — N18.31 STAGE 3A CHRONIC KIDNEY DISEASE (MULTI): ICD-10-CM

## 2024-12-09 DIAGNOSIS — I10 PRIMARY HYPERTENSION: ICD-10-CM

## 2024-12-09 DIAGNOSIS — J45.909 UNSPECIFIED ASTHMA, UNCOMPLICATED (HHS-HCC): ICD-10-CM

## 2024-12-09 DIAGNOSIS — R79.89 ABNORMAL TSH: Primary | ICD-10-CM

## 2024-12-09 DIAGNOSIS — I10 ESSENTIAL (PRIMARY) HYPERTENSION: ICD-10-CM

## 2024-12-09 PROCEDURE — 1170F FXNL STATUS ASSESSED: CPT | Performed by: STUDENT IN AN ORGANIZED HEALTH CARE EDUCATION/TRAINING PROGRAM

## 2024-12-09 PROCEDURE — 3077F SYST BP >= 140 MM HG: CPT | Performed by: STUDENT IN AN ORGANIZED HEALTH CARE EDUCATION/TRAINING PROGRAM

## 2024-12-09 PROCEDURE — 1036F TOBACCO NON-USER: CPT | Performed by: INTERNAL MEDICINE

## 2024-12-09 PROCEDURE — 1036F TOBACCO NON-USER: CPT | Performed by: STUDENT IN AN ORGANIZED HEALTH CARE EDUCATION/TRAINING PROGRAM

## 2024-12-09 PROCEDURE — 1160F RVW MEDS BY RX/DR IN RCRD: CPT | Performed by: INTERNAL MEDICINE

## 2024-12-09 PROCEDURE — 1159F MED LIST DOCD IN RCRD: CPT | Performed by: STUDENT IN AN ORGANIZED HEALTH CARE EDUCATION/TRAINING PROGRAM

## 2024-12-09 PROCEDURE — 3077F SYST BP >= 140 MM HG: CPT | Performed by: INTERNAL MEDICINE

## 2024-12-09 PROCEDURE — 99213 OFFICE O/P EST LOW 20 MIN: CPT | Performed by: INTERNAL MEDICINE

## 2024-12-09 PROCEDURE — G0439 PPPS, SUBSEQ VISIT: HCPCS | Performed by: STUDENT IN AN ORGANIZED HEALTH CARE EDUCATION/TRAINING PROGRAM

## 2024-12-09 PROCEDURE — 3078F DIAST BP <80 MM HG: CPT | Performed by: INTERNAL MEDICINE

## 2024-12-09 PROCEDURE — 1124F ACP DISCUSS-NO DSCNMKR DOCD: CPT | Performed by: STUDENT IN AN ORGANIZED HEALTH CARE EDUCATION/TRAINING PROGRAM

## 2024-12-09 PROCEDURE — 1159F MED LIST DOCD IN RCRD: CPT | Performed by: INTERNAL MEDICINE

## 2024-12-09 PROCEDURE — 3078F DIAST BP <80 MM HG: CPT | Performed by: STUDENT IN AN ORGANIZED HEALTH CARE EDUCATION/TRAINING PROGRAM

## 2024-12-09 PROCEDURE — 1160F RVW MEDS BY RX/DR IN RCRD: CPT | Performed by: STUDENT IN AN ORGANIZED HEALTH CARE EDUCATION/TRAINING PROGRAM

## 2024-12-09 PROCEDURE — 99214 OFFICE O/P EST MOD 30 MIN: CPT | Performed by: STUDENT IN AN ORGANIZED HEALTH CARE EDUCATION/TRAINING PROGRAM

## 2024-12-09 RX ORDER — ALBUTEROL SULFATE 0.83 MG/ML
SOLUTION RESPIRATORY (INHALATION)
Qty: 360 ML | Refills: 3 | Status: SHIPPED | OUTPATIENT
Start: 2024-12-09

## 2024-12-09 RX ORDER — HYDROCHLOROTHIAZIDE 25 MG/1
25 TABLET ORAL DAILY
Qty: 90 TABLET | Refills: 3 | Status: SHIPPED | OUTPATIENT
Start: 2024-12-09

## 2024-12-09 RX ORDER — LOSARTAN POTASSIUM 50 MG/1
50 TABLET ORAL DAILY
Qty: 90 TABLET | Refills: 3 | Status: SHIPPED | OUTPATIENT
Start: 2024-12-09

## 2024-12-09 ASSESSMENT — ENCOUNTER SYMPTOMS
FEVER: 0
HEADACHES: 0
BRUISES/BLEEDS EASILY: 0
FREQUENCY: 0
EYE DISCHARGE: 0
SLEEP DISTURBANCE: 0
HEMATURIA: 0
TREMORS: 0
ARTHRALGIAS: 0
WEAKNESS: 0
DYSURIA: 0
SPEECH DIFFICULTY: 0
WHEEZING: 0
AGITATION: 0
ADENOPATHY: 0
NUMBNESS: 0
NERVOUS/ANXIOUS: 0
FATIGUE: 0
DIFFICULTY URINATING: 0
UNEXPECTED WEIGHT CHANGE: 0
ABDOMINAL PAIN: 0
SINUS PRESSURE: 0
SHORTNESS OF BREATH: 1
CONSTIPATION: 0
RHINORRHEA: 0
APNEA: 0
FACIAL SWELLING: 0
COUGH: 0
STRIDOR: 0
EYE REDNESS: 0
NAUSEA: 0
DIZZINESS: 0
LIGHT-HEADEDNESS: 0
PALPITATIONS: 0
SINUS PAIN: 0
CHOKING: 0
JOINT SWELLING: 0
ABDOMINAL DISTENTION: 0

## 2024-12-09 ASSESSMENT — ACTIVITIES OF DAILY LIVING (ADL)
DRESSING: INDEPENDENT
DOING_HOUSEWORK: TOTAL CARE
TAKING_MEDICATION: TOTAL CARE
MANAGING_FINANCES: TOTAL CARE
GROCERY_SHOPPING: TOTAL CARE
BATHING: INDEPENDENT

## 2024-12-09 ASSESSMENT — PATIENT HEALTH QUESTIONNAIRE - PHQ9
1. LITTLE INTEREST OR PLEASURE IN DOING THINGS: NOT AT ALL
SUM OF ALL RESPONSES TO PHQ9 QUESTIONS 1 AND 2: 0
2. FEELING DOWN, DEPRESSED OR HOPELESS: NOT AT ALL
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
## 2024-12-09 NOTE — PROGRESS NOTES
Subjective   Reason for Visit: Eileen Myers is an 89 y.o. female here for a Medicare Wellness visit.     Past Medical, Surgical, and Family History reviewed and updated in chart.    Reviewed all medications by prescribing practitioner or clinical pharmacist (such as prescriptions, OTCs, herbal therapies and supplements) and documented in the medical record.    HPI      89 y/o female with HTN, HPL , asthma,  Thyroid and  Pulm nodules, mild fusiform dilatation of the ascending thoracic aorta, Cervical Radiculopathy, GOA , ckd stage 3         Patient Care Team:  Christelle Gonzales MD as PCP - General (Family Medicine)  Christelle Gonzales MD as PCP - [employer] PCP     Current Outpatient Medications   Medication Instructions    albuterol 2.5 mg /3 mL (0.083 %) nebulizer solution INHALE THE CONTENTS OF 1 VIAL VIA NEBULIZER FOUR TIMES DAILY AS NEEDED.    atorvastatin (LIPITOR) 40 mg, oral, Nightly    carvedilol (COREG) 6.25 mg, oral, 2 times daily (morning and late afternoon)    cholecalciferol (Vitamin D-3) 10 MCG (400 UNIT) tablet Take by mouth.    cyanocobalamin (Vitamin B-12) 1,000 mcg tablet Take by mouth.    diaper,brief,adult,disposable (Depend Easy Fit Undergarments) misc Use as needed for urinary incontinence    diclofenac sodium (VOLTAREN) 4 g, Topical, 4 times daily PRN    esomeprazole (NEXIUM) 40 mg, oral, Daily    fluticasone (Flonase) 50 mcg/actuation nasal spray INSTILL 1 SQUIRT TWICE DAILY IN EACH NOSTRIL    fluticasone (Flovent) 110 mcg/actuation inhaler 1 puff, inhalation, 2 times daily RT, Rinse mouth with water after use to reduce aftertaste and incidence of candidiasis. Do not swallow.    hydroCHLOROthiazide (HYDRODIURIL) 25 mg, oral, Daily    inhalational spacing device (BreatheRite MDI Spacer) inhaler Use as instructed    losartan (COZAAR) 50 mg, oral, Daily    montelukast (SINGULAIR) 10 mg, oral, Every evening    potassium chloride CR 20 mEq ER tablet 20 mEq, oral, 2 times  daily, Do not crush or chew.    Qvar RediHaler 40 mcg/actuation inhaler INHALE 2 PUFFS BY MOUTH TWICE DAILY. RINSE MOUTH WITH WATER AFTER USE FOR AFTERTASTE AND INCIDENCE OF CANDIDIASIS. DO NOT SWALLOW    triamcinolone (Kenalog) 0.5 % ointment Topical, 2 times daily, To the area on the left foot        Social History     Tobacco Use    Smoking status: Never    Smokeless tobacco: Never   Substance Use Topics    Alcohol use: Not Currently        Review of Systems  Constitutional: no chills, no fever and no night sweats.     Eyes: no blurred vision and no eyesight problems.     ENT: no hearing loss, no nasal congestion, no nasal discharge, no hoarseness and no sore throat.     Cardiovascular: no chest pain, no intermittent leg claudication, no lower extremity edema, no palpitations and no syncope.     Respiratory: no cough, no shortness of breath during exertion, no shortness of breath at rest and no wheezing.     Gastrointestinal: no abdominal pain, no blood in stools, no constipation, no diarrhea, no melena, no nausea, no rectal pain and no vomiting.     Genitourinary: no dysuria, no change in urinary frequency, no urinary hesitancy, no feelings of urinary urgency and no vaginal discharge.     Musculoskeletal: no arthralgias, no back pain and no myalgias.     Integumentary: no new skin lesions and no rashes.     Neurological: no difficulty walking, no headache, no limb weakness, no numbness and no tingling.     Psychiatric: no anxiety, no depression, no anhedonia and no substance use disorders.     Endocrine: no recent weight gain and no recent weight loss.     Hematologic/Lymphatic: no tendency for easy bruising and no swollen glands.          All other systems have been reviewed and are negative for complaint.    Objective   Vitals:  /70   Pulse 88   Ht 1.524 m (5')   Wt 78.1 kg (172 lb 3.2 oz)   SpO2 98%   BMI 33.63 kg/m²       Physical Exam    Constitutional: Alert and in no acute distress. Well  developed, well nourished.     Eyes: Normal external exam. Pupils were equal in size, round, reactive to light (PERRL) with normal accommodation and extraocular movements intact (EOMI).     Ears, Nose, Mouth, and Throat: External inspection of ears and nose: Normal.  Otoscopic examination: Normal.      Neck: No neck mass was observed. Supple.     Cardiovascular: Heart rate and rhythm were normal, normal S1 and S2, no gallops, no murmurs and no pericardial rub    Pulmonary: No respiratory distress. Clear bilateral breath sounds.     Abdomen: Soft nontender; no abdominal mass palpated. No organomegaly.     Musculoskeletal: No joint swelling seen, normal movements of all extremities. Range of motion: Normal.  Muscle strength/tone: Normal.        Neurologic: Deep tendon reflexes were 2+ and symmetric. Sensation: Normal.     Psychiatric: Judgment and insight: Intact. Mood and affect: Normal.      Assessment/Plan   Problem List Items Addressed This Visit       Hypertension    Relevant Medications    losartan (Cozaar) 50 mg tablet    Stage 3a chronic kidney disease (Multi)    Relevant Orders    Basic Metabolic Panel     Other Visit Diagnoses       Abnormal TSH    -  Primary    Relevant Orders    Triiodothyronine, Total    Medicare annual wellness visit, subsequent        Relevant Orders    TSH with reflex to Free T4 if abnormal    Essential (primary) hypertension        Relevant Medications    hydroCHLOROthiazide (HYDRODiuril) 25 mg tablet          89 y/o female with HTN, HPL , asthma,  Thyroid and  Pulm nodules, mild fusiform dilatation of the ascending thoracic aorta, Cervical Radiculopathy, GOA , ckd stage 3      MMSE 28/30    Pt and her son were  very agitated today .  They came early for their 11: 45 appt and expected to be seen sooner , which I accommodated , despite having 2 other pts who were scheduled prior to her time.     She is upset at every visit and on the phone and assumes we are withholding from her having  a HHA   I did inform her that its up to her health insurance to provide a HHA if it is covered or out of pocket .     Son was also upset that he has to go to the pharm every 30 days for her meds. I have been dispensing 90 days worth with refills .     They were upset with my MA as well when was assessing her MMSE .     Son declined audiology referral for her today , will let me know      Will check labs to monitor  to her CKD and hypothyroidism     GERd: on PPI     HTN: rpt BP  150/70    Immunizations :  Influenza : declined  Prevnar 20 : declined  Pneumovax 23:  declined  Shingles: recommended to receive at the pharmacy   Cancer screenings:   Mammogram :   not indicated  Cervical cancer:  not indicated  Colon cancer:   not indicated  Lung cancer :     not indicated  HIV screening:    not indicated  Osteoporosis :       This note is intended for the physician writing it, as well as to communicate findings to other healthcare professionals. These notes use medical lexicon that may be misunderstood by non medical persons. Therefore, interpretations of medical notes and terminology should be approached with caution.

## 2024-12-09 NOTE — PATIENT INSTRUCTIONS
I have renewed the albuterol to be used via nebulization and you will continue your other medications.  He will come back in 3 months.

## 2024-12-17 ENCOUNTER — LAB (OUTPATIENT)
Dept: LAB | Facility: LAB | Age: 89
End: 2024-12-17
Payer: COMMERCIAL

## 2024-12-17 DIAGNOSIS — Z00.00 MEDICARE ANNUAL WELLNESS VISIT, SUBSEQUENT: ICD-10-CM

## 2024-12-17 DIAGNOSIS — N18.31 STAGE 3A CHRONIC KIDNEY DISEASE (MULTI): ICD-10-CM

## 2024-12-17 DIAGNOSIS — R79.89 ABNORMAL TSH: ICD-10-CM

## 2024-12-17 LAB
ANION GAP SERPL CALC-SCNC: 11 MMOL/L (ref 10–20)
BUN SERPL-MCNC: 22 MG/DL (ref 6–23)
CALCIUM SERPL-MCNC: 8.9 MG/DL (ref 8.6–10.3)
CHLORIDE SERPL-SCNC: 105 MMOL/L (ref 98–107)
CO2 SERPL-SCNC: 28 MMOL/L (ref 21–32)
CREAT SERPL-MCNC: 0.91 MG/DL (ref 0.5–1.05)
EGFRCR SERPLBLD CKD-EPI 2021: 58 ML/MIN/1.73M*2
GLUCOSE SERPL-MCNC: 93 MG/DL (ref 74–99)
POTASSIUM SERPL-SCNC: 4.2 MMOL/L (ref 3.5–5.3)
SODIUM SERPL-SCNC: 140 MMOL/L (ref 136–145)
T3 SERPL-MCNC: 115 NG/DL (ref 60–200)
T4 FREE SERPL-MCNC: 0.99 NG/DL (ref 0.61–1.12)
TSH SERPL-ACNC: 4.69 MIU/L (ref 0.44–3.98)

## 2024-12-17 PROCEDURE — 80048 BASIC METABOLIC PNL TOTAL CA: CPT

## 2024-12-17 PROCEDURE — 84480 ASSAY TRIIODOTHYRONINE (T3): CPT

## 2024-12-17 PROCEDURE — 84443 ASSAY THYROID STIM HORMONE: CPT

## 2024-12-17 PROCEDURE — 36415 COLL VENOUS BLD VENIPUNCTURE: CPT

## 2024-12-17 PROCEDURE — 84439 ASSAY OF FREE THYROXINE: CPT

## 2025-01-20 ENCOUNTER — APPOINTMENT (OUTPATIENT)
Dept: PRIMARY CARE | Facility: CLINIC | Age: OVER 89
End: 2025-01-20
Payer: COMMERCIAL

## 2025-02-14 ENCOUNTER — APPOINTMENT (OUTPATIENT)
Dept: PRIMARY CARE | Facility: CLINIC | Age: OVER 89
End: 2025-02-14
Payer: MEDICARE

## 2025-02-14 VITALS
SYSTOLIC BLOOD PRESSURE: 158 MMHG | WEIGHT: 173.2 LBS | TEMPERATURE: 97.8 F | HEART RATE: 80 BPM | DIASTOLIC BLOOD PRESSURE: 67 MMHG | BODY MASS INDEX: 33.83 KG/M2

## 2025-02-14 DIAGNOSIS — B35.9 DERMATOPHYTOSIS: Primary | ICD-10-CM

## 2025-02-14 DIAGNOSIS — E78.5 HYPERLIPIDEMIA, UNSPECIFIED: ICD-10-CM

## 2025-02-14 DIAGNOSIS — I10 ESSENTIAL (PRIMARY) HYPERTENSION: ICD-10-CM

## 2025-02-14 DIAGNOSIS — K21.9 GASTRO-ESOPHAGEAL REFLUX DISEASE WITHOUT ESOPHAGITIS: ICD-10-CM

## 2025-02-14 DIAGNOSIS — I10 PRIMARY HYPERTENSION: ICD-10-CM

## 2025-02-14 DIAGNOSIS — E87.6 HYPOKALEMIA: ICD-10-CM

## 2025-02-14 PROCEDURE — 3078F DIAST BP <80 MM HG: CPT | Performed by: FAMILY MEDICINE

## 2025-02-14 PROCEDURE — 1036F TOBACCO NON-USER: CPT | Performed by: FAMILY MEDICINE

## 2025-02-14 PROCEDURE — 3077F SYST BP >= 140 MM HG: CPT | Performed by: FAMILY MEDICINE

## 2025-02-14 PROCEDURE — 99214 OFFICE O/P EST MOD 30 MIN: CPT | Performed by: FAMILY MEDICINE

## 2025-02-14 PROCEDURE — G2211 COMPLEX E/M VISIT ADD ON: HCPCS | Performed by: FAMILY MEDICINE

## 2025-02-14 RX ORDER — POTASSIUM CHLORIDE 20 MEQ/1
20 TABLET, EXTENDED RELEASE ORAL 2 TIMES DAILY
Qty: 180 TABLET | Refills: 3 | Status: SHIPPED | OUTPATIENT
Start: 2025-02-14

## 2025-02-14 RX ORDER — KETOCONAZOLE 20 MG/G
CREAM TOPICAL 2 TIMES DAILY
Qty: 30 G | Refills: 1 | Status: SHIPPED | OUTPATIENT
Start: 2025-02-14 | End: 2026-02-14

## 2025-02-14 RX ORDER — HYDROCHLOROTHIAZIDE 25 MG/1
25 TABLET ORAL DAILY
Qty: 90 TABLET | Refills: 3 | Status: SHIPPED | OUTPATIENT
Start: 2025-02-14

## 2025-02-14 RX ORDER — CARVEDILOL 6.25 MG/1
6.25 TABLET ORAL
Qty: 180 TABLET | Refills: 3 | Status: SHIPPED | OUTPATIENT
Start: 2025-02-14

## 2025-02-14 RX ORDER — ATORVASTATIN CALCIUM 40 MG/1
40 TABLET, FILM COATED ORAL NIGHTLY
Qty: 90 TABLET | Refills: 3 | Status: SHIPPED | OUTPATIENT
Start: 2025-02-14

## 2025-02-14 RX ORDER — LOSARTAN POTASSIUM 50 MG/1
50 TABLET ORAL DAILY
Qty: 90 TABLET | Refills: 3 | Status: SHIPPED | OUTPATIENT
Start: 2025-02-14

## 2025-02-14 RX ORDER — ESOMEPRAZOLE MAGNESIUM 40 MG/1
40 CAPSULE, DELAYED RELEASE ORAL DAILY
Qty: 90 CAPSULE | Refills: 3 | Status: SHIPPED | OUTPATIENT
Start: 2025-02-14

## 2025-02-14 NOTE — PROGRESS NOTES
Subjective   Patient ID: Eileen Myers is a 89 y.o. female who presents for Establish Care.  HPI  The patient is a 89 yo AA female with a history of GERD, arthritis, asthma, HTN, hyperlipidemia, hypokalemia, Thyroid and  Pulm nodules, mild fusiform dilatation of the ascending thoracic aorta, Cervical Radiculopathy, GOA , ckd stage 3 here for a reestablishment of care and for medication refills.    She is also concerned about a left anterior and proximal dorsum left foot rash started about 5- 6 months ago.    A review of system was completed.  All systems were reviewed and were normal, except for the ones that are listed in the HPI.    Objective   Physical Exam  Constitutional:       Appearance: Normal appearance.   HENT:      Head: Normocephalic and atraumatic.      Right Ear: Tympanic membrane, ear canal and external ear normal.      Left Ear: Tympanic membrane, ear canal and external ear normal.      Nose: Nose normal.      Mouth/Throat:      Mouth: Mucous membranes are moist.      Pharynx: Oropharynx is clear.   Eyes:      Extraocular Movements: Extraocular movements intact.      Conjunctiva/sclera: Conjunctivae normal.      Pupils: Pupils are equal, round, and reactive to light.   Cardiovascular:      Rate and Rhythm: Normal rate and regular rhythm.      Pulses: Normal pulses.   Pulmonary:      Effort: Pulmonary effort is normal.      Breath sounds: Normal breath sounds.   Abdominal:      General: Abdomen is flat. Bowel sounds are normal.      Palpations: Abdomen is soft.   Musculoskeletal:         General: Normal range of motion.      Cervical back: Normal range of motion and neck supple.   Skin:     General: Skin is warm.   Neurological:      General: No focal deficit present.      Mental Status: She is alert and oriented to person, place, and time. Mental status is at baseline.   Psychiatric:         Mood and Affect: Mood normal.         Behavior: Behavior normal.         Thought Content: Thought content  normal.         Judgment: Judgment normal.         Assessment/Plan   Problem List Items Addressed This Visit       Hypokalemia     -Klor-Con refilled today.          Relevant Medications    potassium chloride CR 20 mEq ER tablet    Hypertension     -HTCZ, Losartan, carvedilol, refilled.          Relevant Medications    losartan (Cozaar) 50 mg tablet    carvedilol (Coreg) 6.25 mg tablet    Hyperlipidemia, unspecified    Relevant Medications    atorvastatin (Lipitor) 40 mg tablet    Essential (primary) hypertension    Relevant Medications    hydroCHLOROthiazide (HYDRODiuril) 25 mg tablet    Gastro-esophageal reflux disease without esophagitis     -Esomeprazole refilled.          Relevant Medications    esomeprazole (NexIUM) 40 mg DR capsule    Dermatophytosis - Primary     -Ketoconazole cream BID for 4 weeks started.          Relevant Medications    ketoconazole (NIZOral) 2 % cream    Patient to return to office in 6 months.

## 2025-02-20 ENCOUNTER — TELEPHONE (OUTPATIENT)
Dept: PRIMARY CARE | Facility: CLINIC | Age: OVER 89
End: 2025-02-20
Payer: MEDICARE

## 2025-02-24 ENCOUNTER — HOME HEALTH ADMISSION (OUTPATIENT)
Dept: HOME HEALTH SERVICES | Facility: HOME HEALTH | Age: OVER 89
End: 2025-02-24
Payer: MEDICARE

## 2025-02-24 ENCOUNTER — TELEPHONE (OUTPATIENT)
Dept: PRIMARY CARE | Facility: CLINIC | Age: OVER 89
End: 2025-02-24
Payer: MEDICARE

## 2025-02-24 ENCOUNTER — HOME CARE VISIT (OUTPATIENT)
Dept: HOME HEALTH SERVICES | Facility: HOME HEALTH | Age: OVER 89
End: 2025-02-24

## 2025-02-24 ENCOUNTER — DOCUMENTATION (OUTPATIENT)
Dept: HOME HEALTH SERVICES | Facility: HOME HEALTH | Age: OVER 89
End: 2025-02-24
Payer: MEDICARE

## 2025-02-24 ENCOUNTER — TELEPHONE (OUTPATIENT)
Dept: HOME HEALTH SERVICES | Facility: HOME HEALTH | Age: OVER 89
End: 2025-02-24
Payer: MEDICARE

## 2025-02-24 PROBLEM — R54 AGE-RELATED PHYSICAL DEBILITY: Status: ACTIVE | Noted: 2025-02-24

## 2025-02-24 NOTE — HH CARE COORDINATION
Home Care received a Referral for Physical Therapy, Occupational Therapy, Home Health Aide, and Medical Social Work. We have processed the referral for a Start of Care on 2/25-2/26.     If you have any questions or concerns, please feel free to contact us at [phone]. Follow the prompts, enter your five digit zip code, and you will be directed to your care team on [facility] 3.

## 2025-02-24 NOTE — TELEPHONE ENCOUNTER
Hello,  home care has a new referral for this pt. We are reviewing the referral, labs are not specified, however  certified home care does not come out for labs only. We will finish reviewing for all disciplines ordered, however pt will need to go to a lab for lab needs, or you can refer them to the following-. [facility] ([phone]) is a mobile lab company, or you may contact  [facility] ([phone]) to see if they may assist patient.

## 2025-02-25 NOTE — TELEPHONE ENCOUNTER
Good afternoon Dr. Epps - I wanted to inform you that your patient has declined homecare services at this time. States that she was looking for an aide to come to the home to assist with cooking, cleaning, bathing, etc. If patient changes their mind and would like homecare, a new referral can be sent in at that time. Please let me know if there are any questions or concerns. Thank you!

## 2025-02-28 ENCOUNTER — TELEPHONE (OUTPATIENT)
Dept: PRIMARY CARE | Facility: CLINIC | Age: OVER 89
End: 2025-02-28
Payer: MEDICARE

## 2025-03-10 ENCOUNTER — OFFICE VISIT (OUTPATIENT)
Dept: PULMONOLOGY | Facility: CLINIC | Age: OVER 89
End: 2025-03-10
Payer: MEDICARE

## 2025-03-10 ENCOUNTER — APPOINTMENT (OUTPATIENT)
Dept: PRIMARY CARE | Facility: CLINIC | Age: OVER 89
End: 2025-03-10
Payer: COMMERCIAL

## 2025-03-10 VITALS
BODY MASS INDEX: 33.98 KG/M2 | RESPIRATION RATE: 16 BRPM | DIASTOLIC BLOOD PRESSURE: 76 MMHG | HEART RATE: 80 BPM | SYSTOLIC BLOOD PRESSURE: 157 MMHG | TEMPERATURE: 97.5 F | WEIGHT: 174 LBS | OXYGEN SATURATION: 96 %

## 2025-03-10 DIAGNOSIS — J45.40 MODERATE PERSISTENT ASTHMA WITHOUT COMPLICATION (HHS-HCC): Primary | ICD-10-CM

## 2025-03-10 PROCEDURE — 3077F SYST BP >= 140 MM HG: CPT | Performed by: INTERNAL MEDICINE

## 2025-03-10 PROCEDURE — 1036F TOBACCO NON-USER: CPT | Performed by: INTERNAL MEDICINE

## 2025-03-10 PROCEDURE — 3078F DIAST BP <80 MM HG: CPT | Performed by: INTERNAL MEDICINE

## 2025-03-10 PROCEDURE — 99213 OFFICE O/P EST LOW 20 MIN: CPT | Performed by: INTERNAL MEDICINE

## 2025-03-10 PROCEDURE — 1160F RVW MEDS BY RX/DR IN RCRD: CPT | Performed by: INTERNAL MEDICINE

## 2025-03-10 PROCEDURE — 1159F MED LIST DOCD IN RCRD: CPT | Performed by: INTERNAL MEDICINE

## 2025-03-10 ASSESSMENT — ENCOUNTER SYMPTOMS
NUMBNESS: 0
DIZZINESS: 0
FEVER: 0
ABDOMINAL DISTENTION: 0
PALPITATIONS: 0
SLEEP DISTURBANCE: 0
WEAKNESS: 0
HEADACHES: 0
ADENOPATHY: 0
NERVOUS/ANXIOUS: 0
EYE REDNESS: 0
ARTHRALGIAS: 0
RHINORRHEA: 0
COUGH: 1
AGITATION: 0
CHOKING: 0
ABDOMINAL PAIN: 0
EYE DISCHARGE: 0
SINUS PRESSURE: 0
NAUSEA: 0
FACIAL SWELLING: 0
FATIGUE: 0
LIGHT-HEADEDNESS: 0
HEMATURIA: 0
SINUS PAIN: 0
WHEEZING: 0
APNEA: 0
STRIDOR: 0
TREMORS: 0
SPEECH DIFFICULTY: 0
FREQUENCY: 0
DYSURIA: 0
JOINT SWELLING: 0
CONSTIPATION: 0
SHORTNESS OF BREATH: 0
UNEXPECTED WEIGHT CHANGE: 0
DIFFICULTY URINATING: 0
BRUISES/BLEEDS EASILY: 0
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
## 2025-03-12 DIAGNOSIS — J45.909 UNSPECIFIED ASTHMA, UNCOMPLICATED (HHS-HCC): ICD-10-CM

## 2025-03-12 RX ORDER — MONTELUKAST SODIUM 10 MG/1
10 TABLET ORAL EVERY EVENING
Qty: 90 TABLET | Refills: 3 | Status: SHIPPED | OUTPATIENT
Start: 2025-03-12

## 2025-04-07 DIAGNOSIS — E87.6 HYPOKALEMIA: Primary | ICD-10-CM

## 2025-04-08 RX ORDER — POTASSIUM CHLORIDE 20 MEQ/1
20 TABLET, EXTENDED RELEASE ORAL 2 TIMES DAILY
Qty: 180 TABLET | Refills: 3 | Status: SHIPPED | OUTPATIENT
Start: 2025-04-08

## 2025-04-16 DIAGNOSIS — J45.909 ASTHMA, UNSPECIFIED ASTHMA SEVERITY, UNSPECIFIED WHETHER COMPLICATED, UNSPECIFIED WHETHER PERSISTENT (HHS-HCC): ICD-10-CM

## 2025-04-16 DIAGNOSIS — J45.909 UNSPECIFIED ASTHMA, UNCOMPLICATED (HHS-HCC): ICD-10-CM

## 2025-04-16 DIAGNOSIS — R09.81 NASAL SINUS CONGESTION: ICD-10-CM

## 2025-04-16 RX ORDER — FLUTICASONE PROPIONATE 50 MCG
SPRAY, SUSPENSION (ML) NASAL
Qty: 48 ML | Refills: 5 | Status: SHIPPED | OUTPATIENT
Start: 2025-04-16

## 2025-04-16 RX ORDER — ALBUTEROL SULFATE 0.83 MG/ML
SOLUTION RESPIRATORY (INHALATION)
Qty: 360 ML | Refills: 3 | Status: SHIPPED | OUTPATIENT
Start: 2025-04-16

## 2025-04-16 RX ORDER — MONTELUKAST SODIUM 10 MG/1
10 TABLET ORAL EVERY EVENING
Qty: 90 TABLET | Refills: 3 | Status: SHIPPED | OUTPATIENT
Start: 2025-04-16

## 2025-05-16 ENCOUNTER — APPOINTMENT (OUTPATIENT)
Dept: PRIMARY CARE | Facility: CLINIC | Age: OVER 89
End: 2025-05-16
Payer: MEDICARE

## 2025-05-16 VITALS
WEIGHT: 174 LBS | DIASTOLIC BLOOD PRESSURE: 55 MMHG | TEMPERATURE: 96.4 F | BODY MASS INDEX: 33.98 KG/M2 | SYSTOLIC BLOOD PRESSURE: 159 MMHG | HEART RATE: 65 BPM

## 2025-05-16 DIAGNOSIS — I10 ESSENTIAL (PRIMARY) HYPERTENSION: Primary | ICD-10-CM

## 2025-05-16 DIAGNOSIS — N18.31 STAGE 3A CHRONIC KIDNEY DISEASE (MULTI): ICD-10-CM

## 2025-05-16 DIAGNOSIS — E66.01 OBESITY, MORBID (MULTI): ICD-10-CM

## 2025-05-16 DIAGNOSIS — J41.0 SIMPLE CHRONIC BRONCHITIS (MULTI): ICD-10-CM

## 2025-05-16 PROCEDURE — 1157F ADVNC CARE PLAN IN RCRD: CPT | Performed by: FAMILY MEDICINE

## 2025-05-16 PROCEDURE — 99214 OFFICE O/P EST MOD 30 MIN: CPT | Performed by: FAMILY MEDICINE

## 2025-05-16 PROCEDURE — G2211 COMPLEX E/M VISIT ADD ON: HCPCS | Performed by: FAMILY MEDICINE

## 2025-05-16 PROCEDURE — 1126F AMNT PAIN NOTED NONE PRSNT: CPT | Performed by: FAMILY MEDICINE

## 2025-05-16 PROCEDURE — 1159F MED LIST DOCD IN RCRD: CPT | Performed by: FAMILY MEDICINE

## 2025-05-16 PROCEDURE — 3077F SYST BP >= 140 MM HG: CPT | Performed by: FAMILY MEDICINE

## 2025-05-16 PROCEDURE — 3078F DIAST BP <80 MM HG: CPT | Performed by: FAMILY MEDICINE

## 2025-05-16 PROCEDURE — 1124F ACP DISCUSS-NO DSCNMKR DOCD: CPT | Performed by: FAMILY MEDICINE

## 2025-05-16 ASSESSMENT — PAIN SCALES - GENERAL: PAINLEVEL_OUTOF10: 0-NO PAIN

## 2025-05-16 NOTE — PROGRESS NOTES
Subjective   Patient ID: Eileen Myers is a 89 y.o. female who presents for Follow-up.  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     A review of system was completed.  All systems were reviewed and were normal, except for the ones that are listed in the HPI.    Objective   Physical Exam  Constitutional:       Appearance: Normal appearance.   HENT:      Head: Normocephalic and atraumatic.      Right Ear: Tympanic membrane, ear canal and external ear normal.      Left Ear: Tympanic membrane, ear canal and external ear normal.      Nose: Nose normal.      Mouth/Throat:      Mouth: Mucous membranes are moist.      Pharynx: Oropharynx is clear.   Eyes:      Extraocular Movements: Extraocular movements intact.      Conjunctiva/sclera: Conjunctivae normal.      Pupils: Pupils are equal, round, and reactive to light.   Cardiovascular:      Rate and Rhythm: Normal rate and regular rhythm.      Pulses: Normal pulses.   Pulmonary:      Effort: Pulmonary effort is normal.      Breath sounds: Normal breath sounds.   Abdominal:      General: Abdomen is flat. Bowel sounds are normal.      Palpations: Abdomen is soft.   Musculoskeletal:         General: Normal range of motion.      Cervical back: Normal range of motion and neck supple.      Comments: Ambulates with a walker.    Skin:     General: Skin is warm.   Neurological:      General: No focal deficit present.      Mental Status: She is alert and oriented to person, place, and time. Mental status is at baseline.   Psychiatric:         Mood and Affect: Mood normal.         Behavior: Behavior normal.         Thought Content: Thought content normal.         Judgment: Judgment normal.     Assessment/Plan   Problem List  Items Addressed This Visit       Obesity, morbid (Multi)    COPD (chronic obstructive pulmonary disease) (Multi)    -Well controlled.  -Continue Albuterol and Qvar.          Stage 3a chronic kidney disease (Multi)    -Stable. Baseline creat: 0.91 on 12/17/2024- normal.          Essential (primary) hypertension - Primary    -Stable with Losartan, hydrochlorothiazide and carvedilol.         Patient to return to office in 6 months for routine care.

## 2025-06-16 ENCOUNTER — OFFICE VISIT (OUTPATIENT)
Dept: PULMONOLOGY | Facility: CLINIC | Age: OVER 89
End: 2025-06-16
Payer: MEDICARE

## 2025-06-16 VITALS
SYSTOLIC BLOOD PRESSURE: 156 MMHG | HEART RATE: 71 BPM | DIASTOLIC BLOOD PRESSURE: 61 MMHG | TEMPERATURE: 96.4 F | WEIGHT: 175 LBS | BODY MASS INDEX: 34.18 KG/M2 | RESPIRATION RATE: 18 BRPM | OXYGEN SATURATION: 97 %

## 2025-06-16 DIAGNOSIS — J45.909 UNSPECIFIED ASTHMA, UNCOMPLICATED (HHS-HCC): ICD-10-CM

## 2025-06-16 PROCEDURE — 3078F DIAST BP <80 MM HG: CPT | Performed by: INTERNAL MEDICINE

## 2025-06-16 PROCEDURE — 1159F MED LIST DOCD IN RCRD: CPT | Performed by: INTERNAL MEDICINE

## 2025-06-16 PROCEDURE — 3077F SYST BP >= 140 MM HG: CPT | Performed by: INTERNAL MEDICINE

## 2025-06-16 PROCEDURE — 1036F TOBACCO NON-USER: CPT | Performed by: INTERNAL MEDICINE

## 2025-06-16 PROCEDURE — 1160F RVW MEDS BY RX/DR IN RCRD: CPT | Performed by: INTERNAL MEDICINE

## 2025-06-16 PROCEDURE — 99213 OFFICE O/P EST LOW 20 MIN: CPT | Performed by: INTERNAL MEDICINE

## 2025-06-16 PROCEDURE — 99212 OFFICE O/P EST SF 10 MIN: CPT | Performed by: INTERNAL MEDICINE

## 2025-06-16 RX ORDER — MONTELUKAST SODIUM 10 MG/1
10 TABLET ORAL EVERY EVENING
Qty: 90 TABLET | Refills: 3 | Status: SHIPPED | OUTPATIENT
Start: 2025-06-16

## 2025-06-16 ASSESSMENT — ENCOUNTER SYMPTOMS
AGITATION: 0
SPEECH DIFFICULTY: 0
FEVER: 0
PALPITATIONS: 0
HEMATURIA: 0
UNEXPECTED WEIGHT CHANGE: 0
DIFFICULTY URINATING: 0
ABDOMINAL DISTENTION: 0
DYSURIA: 0
EYE REDNESS: 0
HEADACHES: 0
FACIAL SWELLING: 0
CONSTIPATION: 0
RHINORRHEA: 0
WHEEZING: 0
SHORTNESS OF BREATH: 0
FATIGUE: 0
NERVOUS/ANXIOUS: 0
ADENOPATHY: 0
SLEEP DISTURBANCE: 0
FREQUENCY: 0
DIZZINESS: 0
WEAKNESS: 0
SINUS PAIN: 0
CHOKING: 0
ABDOMINAL PAIN: 0
COUGH: 0
ARTHRALGIAS: 0
NUMBNESS: 0
TREMORS: 0
STRIDOR: 0
LIGHT-HEADEDNESS: 0
NAUSEA: 0
EYE DISCHARGE: 0
SINUS PRESSURE: 0
APNEA: 0
BRUISES/BLEEDS EASILY: 0
JOINT SWELLING: 0
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
## 2025-06-16 NOTE — PATIENT INSTRUCTIONS
You are doing quite well at the present time and will continue present therapies and return in 3 months.

## 2025-07-31 ENCOUNTER — TELEPHONE (OUTPATIENT)
Dept: PULMONOLOGY | Facility: CLINIC | Age: OVER 89
End: 2025-07-31
Payer: MEDICARE

## 2025-07-31 DIAGNOSIS — J45.909 UNSPECIFIED ASTHMA, UNCOMPLICATED (HHS-HCC): ICD-10-CM

## 2025-07-31 DIAGNOSIS — J45.909 ASTHMA, UNSPECIFIED ASTHMA SEVERITY, UNSPECIFIED WHETHER COMPLICATED, UNSPECIFIED WHETHER PERSISTENT (HHS-HCC): ICD-10-CM

## 2025-07-31 DIAGNOSIS — R09.81 NASAL SINUS CONGESTION: ICD-10-CM

## 2025-07-31 RX ORDER — FLUTICASONE PROPIONATE 110 UG/1
1 AEROSOL, METERED RESPIRATORY (INHALATION)
Qty: 12 G | Refills: 11 | Status: SHIPPED | OUTPATIENT
Start: 2025-07-31 | End: 2026-07-31

## 2025-07-31 RX ORDER — FLUTICASONE PROPIONATE 50 MCG
SPRAY, SUSPENSION (ML) NASAL
Qty: 48 ML | Refills: 5 | Status: SHIPPED | OUTPATIENT
Start: 2025-07-31

## 2025-07-31 RX ORDER — ALBUTEROL SULFATE 0.83 MG/ML
SOLUTION RESPIRATORY (INHALATION)
Qty: 360 ML | Refills: 3 | Status: SHIPPED | OUTPATIENT
Start: 2025-07-31

## 2025-07-31 RX ORDER — MONTELUKAST SODIUM 10 MG/1
10 TABLET ORAL EVERY EVENING
Qty: 90 TABLET | Refills: 3 | Status: SHIPPED | OUTPATIENT
Start: 2025-07-31

## 2025-07-31 NOTE — TELEPHONE ENCOUNTER
Pt called stating she needs refill of her flonase nasal spray, albuterol nebulizer solution, flovent inhaler, and montelukast to be sent to the [facility] on file.

## 2025-08-05 DIAGNOSIS — I10 ESSENTIAL (PRIMARY) HYPERTENSION: ICD-10-CM

## 2025-08-05 DIAGNOSIS — E87.6 HYPOKALEMIA: ICD-10-CM

## 2025-08-05 DIAGNOSIS — K21.9 GASTRO-ESOPHAGEAL REFLUX DISEASE WITHOUT ESOPHAGITIS: ICD-10-CM

## 2025-08-05 DIAGNOSIS — I10 PRIMARY HYPERTENSION: Primary | ICD-10-CM

## 2025-08-05 DIAGNOSIS — E78.5 HYPERLIPIDEMIA, UNSPECIFIED: ICD-10-CM

## 2025-08-06 RX ORDER — CARVEDILOL 6.25 MG/1
6.25 TABLET ORAL
Qty: 180 TABLET | Refills: 3 | Status: SHIPPED | OUTPATIENT
Start: 2025-08-06

## 2025-08-06 RX ORDER — ESOMEPRAZOLE MAGNESIUM 40 MG/1
40 CAPSULE, DELAYED RELEASE ORAL DAILY
Qty: 90 CAPSULE | Refills: 3 | Status: SHIPPED | OUTPATIENT
Start: 2025-08-06

## 2025-08-06 RX ORDER — HYDROCHLOROTHIAZIDE 25 MG/1
25 TABLET ORAL DAILY
Qty: 90 TABLET | Refills: 3 | Status: SHIPPED | OUTPATIENT
Start: 2025-08-06

## 2025-08-06 RX ORDER — ATORVASTATIN CALCIUM 40 MG/1
40 TABLET, FILM COATED ORAL NIGHTLY
Qty: 90 TABLET | Refills: 3 | Status: SHIPPED | OUTPATIENT
Start: 2025-08-06

## 2025-08-06 RX ORDER — POTASSIUM CHLORIDE 20 MEQ/1
20 TABLET, EXTENDED RELEASE ORAL 2 TIMES DAILY
Qty: 180 TABLET | Refills: 3 | Status: SHIPPED | OUTPATIENT
Start: 2025-08-06

## 2025-08-06 RX ORDER — LOSARTAN POTASSIUM 50 MG/1
50 TABLET ORAL DAILY
Qty: 90 TABLET | Refills: 3 | Status: SHIPPED | OUTPATIENT
Start: 2025-08-06

## 2025-11-17 ENCOUNTER — APPOINTMENT (OUTPATIENT)
Dept: PRIMARY CARE | Facility: CLINIC | Age: OVER 89
End: 2025-11-17
Payer: MEDICARE